# Patient Record
Sex: FEMALE | Race: OTHER | Employment: UNEMPLOYED | URBAN - METROPOLITAN AREA
[De-identification: names, ages, dates, MRNs, and addresses within clinical notes are randomized per-mention and may not be internally consistent; named-entity substitution may affect disease eponyms.]

---
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
## 2022-01-08 ENCOUNTER — NON-APPOINTMENT (OUTPATIENT)
Age: 22
End: 2022-01-08

## 2022-01-25 LAB
HBSAG, EXTERNAL: NEGATIVE
HIV, EXTERNAL: NEGATIVE
RUBELLA, EXTERNAL: NORMAL
T. PALLIDUM, EXTERNAL: NON REACTIVE
TYPE, ABO & RH, EXTERNAL: NORMAL

## 2022-02-01 ENCOUNTER — DOCUMENTATION ONLY (OUTPATIENT)
Dept: FAMILY MEDICINE CLINIC | Age: 22
End: 2022-02-01

## 2022-02-01 NOTE — PROGRESS NOTES
This patient is a new patient who missed an appt on 1/31/22 to establish care in our [facility] clinic. She was previously seen at [facility] for some of her prenatal care and I have scanned these documents into the chart.     Cuauhtemoc Rosas MD

## 2022-02-04 ENCOUNTER — HOSPITAL ENCOUNTER (OUTPATIENT)
Dept: PERINATAL CARE | Age: 22
Discharge: HOME | End: 2022-02-04
Attending: OBSTETRICS & GYNECOLOGY
Payer: MEDICAID

## 2022-02-04 PROCEDURE — 76805 OB US >/= 14 WKS SNGL FETUS: CPT | Performed by: OBSTETRICS & GYNECOLOGY

## 2022-02-23 ENCOUNTER — ROUTINE PRENATAL (OUTPATIENT)
Dept: FAMILY MEDICINE CLINIC | Age: 22
End: 2022-02-23
Payer: MEDICAID

## 2022-02-23 VITALS
WEIGHT: 175.8 LBS | DIASTOLIC BLOOD PRESSURE: 62 MMHG | BODY MASS INDEX: 33.19 KG/M2 | HEART RATE: 78 BPM | TEMPERATURE: 98 F | SYSTOLIC BLOOD PRESSURE: 98 MMHG | HEIGHT: 61 IN | OXYGEN SATURATION: 97 %

## 2022-02-23 DIAGNOSIS — O09.892 HIGH RISK TEEN PREGNANCY IN SECOND TRIMESTER: ICD-10-CM

## 2022-02-23 DIAGNOSIS — Z3A.25 25 WEEKS GESTATION OF PREGNANCY: ICD-10-CM

## 2022-02-23 DIAGNOSIS — O09.32 INITIAL OBSTETRIC VISIT IN SECOND TRIMESTER: ICD-10-CM

## 2022-02-23 DIAGNOSIS — Z86.79 HX OF AORTIC ANEURYSM: ICD-10-CM

## 2022-02-23 DIAGNOSIS — Z3A.25 25 WEEKS GESTATION OF PREGNANCY: Primary | ICD-10-CM

## 2022-02-23 DIAGNOSIS — Z98.890 H/O AORTIC ANEURYSM REPAIR: ICD-10-CM

## 2022-02-23 DIAGNOSIS — Z86.79 H/O AORTIC ANEURYSM REPAIR: ICD-10-CM

## 2022-02-23 LAB
BILIRUB UR QL STRIP: NEGATIVE
GLUCOSE UR-MCNC: NEGATIVE MG/DL
KETONES P FAST UR STRIP-MCNC: NEGATIVE MG/DL
PH UR STRIP: 8.5 [PH] (ref 4.6–8)
PROT UR QL STRIP: NEGATIVE
SP GR UR STRIP: 1.02 (ref 1–1.03)
UA UROBILINOGEN AMB POC: ABNORMAL (ref 0.2–1)
URINALYSIS CLARITY POC: ABNORMAL
URINALYSIS COLOR POC: YELLOW
URINE BLOOD POC: ABNORMAL
URINE LEUKOCYTES POC: ABNORMAL
URINE NITRITES POC: NEGATIVE

## 2022-02-23 PROCEDURE — 0502F SUBSEQUENT PRENATAL CARE: CPT | Performed by: STUDENT IN AN ORGANIZED HEALTH CARE EDUCATION/TRAINING PROGRAM

## 2022-02-23 PROCEDURE — 81003 URINALYSIS AUTO W/O SCOPE: CPT | Performed by: STUDENT IN AN ORGANIZED HEALTH CARE EDUCATION/TRAINING PROGRAM

## 2022-02-23 NOTE — PROGRESS NOTES
Initial OB Visit     [facility] interpretor used due to language barrier [id number]  Subjective:   Maryann Lynn is a 25 y.o. Lizeth Buffy  at 25w4d with BARB of 22 based on 2nd trimester US performed on 22 , who is being seen today for her first initial obstetrical visit. This is not a planned pregnancy. FOB is not involved. She is at 25w4d by 2nd trimester US. See flow sheet for OB history. History of Depression in pregnancy or post partum? No  History of GDM or DM? No  History of GHTN or HTN? No  History of pre-eclampsia? No  History of thyroid disorder? No  History of ? No  History of PTD? No  History of ? No  History of Genetic disorders? No  History of STD's? No  History of abnormal PAP? No  Taking prenatal vitamins? Yes    Pt has a hx of dilated aorta and states that she had a surgery at age 5 to correct the dilation in [facility]. Per pt this was inherited, her brother also required this procedure. Pt endorses going for annual follow up screening exams for her aorta but has not had any follow up in the last 2 years since she has been in the [address] [place] [address],[address]. Pt had been following at [facility] for her prenatal care however they referred her to this clinic as this is a high risk pregnancy. Allergies- reviewed: Allergies   Allergen Reactions    Clarithromycin Rash       Medications- reviewed:   Current Outpatient Medications   Medication Sig    PNV Comb #2-Iron-FA-Omega 3 29-1-400 mg cmpk Take  by mouth. No current facility-administered medications for this visit.        Past Medical History- reviewed:  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    Heart surgery age 5       Past Surgical History- reviewed:   Past Surgical History:   Procedure Laterality Date    HX OTHER SURGICAL  2006    Aorta dilation repair       Social History- reviewed:  Social History     Socioeconomic History    Marital status: SINGLE     Spouse name: Not on file    Number of children: Not on file    Years of education: Not on file    Highest education level: Not on file   Occupational History    Not on file   Tobacco Use    Smoking status: Current Every Day Smoker     Packs/day: 0.25     Types: Cigarettes    Smokeless tobacco: Never Used   Substance and Sexual Activity    Alcohol use: Yes     Comment: occa    Drug use: Never    Sexual activity: Not on file   Other Topics Concern    Not on file   Social History Narrative    Not on file     Social Determinants of Health     Financial Resource Strain:     Difficulty of Paying Living Expenses: Not on file   Food Insecurity:     Worried About Running Out of Food in the Last Year: Not on file    Kim of Food in the Last Year: Not on file   Transportation Needs:     Lack of Transportation (Medical): Not on file    Lack of Transportation (Non-Medical):  Not on file   Physical Activity:     Days of Exercise per Week: Not on file    Minutes of Exercise per Session: Not on file   Stress:     Feeling of Stress : Not on file   Social Connections:     Frequency of Communication with Friends and Family: Not on file    Frequency of Social Gatherings with Friends and Family: Not on file    Attends Orthodoxy Services: Not on file    Active Member of [address] [place] [address] [employer] or Organizations: Not on file    Attends Club or Organization Meetings: Not on file    Marital Status: Not on file   Intimate Partner Violence:     Fear of Current or Ex-Partner: Not on file    Emotionally Abused: Not on file    Physically Abused: Not on file    Sexually Abused: Not on file   Housing Stability:     Unable to Pay for Housing in the Last Year: Not on file    Number of [place] in the Last Year: Not on file    Unstable Housing in the Last Year: Not on file         Objective:     Visit Vitals  BP 98/62 (BP 1 Location: Left upper arm, BP Patient Position: Sitting)   Pulse 78   Temp 98 °F (36.7 °C) (Oral)   Ht 5' 1.22\" (1.555 m)   Wt 175 lb 12.8 oz (79.7 kg)   LMP 07/06/2021   SpO2 97%   BMI 32.98 kg/m²       See physical exam on flowsheet    Labs:    No results found for this or any previous visit (from the past 12 hour(s)). Assessment and Plan:         ICD-10-CM ICD-9-CM    1. 25 weeks gestation of pregnancy  Z3A.25 V22.2    2. Initial obstetric visit in second trimester  O09.32 V23.7        25 y.o. Marcia Aj at 25w4d here for initial OB visit     SIUP:   -PNL (per records from [facility]): O+, Ab neg, Hgb 12. Hep B/NG/CT/RPR negative. Rubella immune. Early 1hr GTT normal.   - Hgb fractionation, HIV, Hep C, varicella, urine culture, and 1hr GTT ordered to complete prenatal labs as listed above. Lab closed at time of encounter, pt to return for lab visit next week. -Genetic screening: Pt would like NIPT - will collect when pt comes for lab visit next week. -Ultrasound: Normal anatomy, female, posterior placenta. -Immunizations: Pt s/p flu vaccine 1/25 per [facility] notes. Needs Tdap at next appt. -UBB: Seen 2/23/22    Hx aortic aneurysm s/p repair: Surgical repair in [facility] at age 5. No follow up since moving to the [facility] (~2yrs). No known underlying etiology for aneurysm per pt (no connective tissue disease). Will order AAA screen and Echo and reach out to [facility] regarding additional management. Teen pregnancy: Will collect UDS. Seen by UBB 2/23. Orders Placed This Encounter    PNV Comb #2-Iron-FA-Omega 3 29-1-400 mg cmpk     Sig: Take  by mouth. I have discussed the diagnosis with the patient and the intended plan as seen in the above orders. The patient has received an after-visit summary and questions were answered concerning future plans. I have discussed medication side effects and warnings with the patient as well. Informed pt to return to the office or go to the ER if she experiences vaginal bleeding, vaginal discharge, leaking of fluid, pelvic cramping.       Pt seen and discussed with Dr. Alexia Sauer (attending physician)    Sagrario Lyons MD  Family Medicine Resident

## 2022-02-23 NOTE — PROGRESS NOTES
Identified Patient with two Patient identifiers (Name and ). Two Patient Identifiers confirmed. Reviewed record in preparation for visit and have obtained necessary documentation. Patient is 25w4d      LEAKAGE OF FLUID: No  BLEEDING: No  FETAL MOVEMENT: Yes  HEMA HOOKS CONTRACTIONS:No  PRENATAL VITAMINS: Yes  PAIN: No    Chief Complaint   Patient presents with    Routine Prenatal Visit            Visit Vitals  BP 98/62 (BP 1 Location: Left upper arm, BP Patient Position: Sitting)   Pulse 78   Temp 98 °F (36.7 °C) (Oral)   Ht 5' 1.22\" (1.555 m)   Wt 175 lb 12.8 oz (79.7 kg)   SpO2 97%   BMI 32.98 kg/m²       1. Have you been to the ER, urgent care clinic since your last visit? Hospitalized since your last visit? No    2. Have you seen or consulted any other health care providers outside of the [address] [place] [address] since your last visit? Include any pap smears or colon screening. No      *Called [facility] to fax over all entire record including ob labs that she got done and they will fax it over today. *

## 2022-02-23 NOTE — PROGRESS NOTES
I reviewed with the resident the medical history and the resident's findings on the physical examination. I discussed with the resident the patient's diagnosis and concur with the plan. 20yo G1 @ 25w4d  1. IUP: RH pos, needs Hep C, VZV. Offer NIPT. 2.  Teen pregnancy: check UDS  3. Transfer of care: from [facility] due to #4   4. Hx congenital heart disease: born with a aortic dilation and at age of 5 had a surgical repair in [place]. Was seen annually for US there. Has been here for 2 years and has not had any monitoring here. No known hx of connective tissues disease. Will get echo and abd US. ? Baby needs fetal echo - will run this history by MFM as it seems they were not aware when they saw pt   5.   Hx asthma: aortic dilation was seen on imaging that she had done for the asthma when she was young

## 2022-02-23 NOTE — PATIENT INSTRUCTIONS
Semanas 26 a 30 de antony embarazo: Instrucciones de cuidado  Weeks 26 to 30 of Your Pregnancy: Care Instructions  Instrucciones de cuidado    Ahora usted se encuentra en el último trimestre del Madisyn Beam. Antony bebé está creciendo rápidamente. Y es probable que sienta que antony bebé se mueve con más frecuencia. Es posible que antony médico le diga que cuente la cantidad de patadas que da antony bebé. La espalda puede dolerle mientras antony cuerpo se acostumbra al tamaño y a la longitud de antony bebé. Si todavía no le aguayo aplicado la vacuna Tdap (tétanos, difteria y tos [place]) prema selena Madisyn Beam, hable con antony médico acerca de aplicársela. Mac Petrin a proteger a antony recién nacido contra la infección por tos ferina. Prema selena tiempo, es importante que se cuide y preste atención a lo que antony cuerpo necesita. Si tiene [employer], busque formas de estar con antony elizabeth que satisfagan antony nivel de comodidad y deseo. Utilice las recomendaciones proporcionadas en esta hoja de cuidados para encontrar antony propia manera de vivir la sexualidad. La atención de seguimiento es janet parte clave de antony tratamiento y seguridad. Asegúrese de hacer y acudir a todas las citas, y llame a antony médico si está teniendo problemas. También es janet buena idea saber los resultados de justus exámenes y mantener janet lista de los medicamentos que shubham. ¿Cómo puede cuidarse en el hogar? [place] antony trabajo con calma  · Tómese descansos frecuentes. Si es posible, deje de trabajar cuando esté cansada y descanse prema la hora del almuerzo. · Vaya al baño cada 2 horas. · Cambie de posición con frecuencia. Si está sentada prema mucho tiempo, póngase de pie y camine. · Si está bui prema mucho tiempo, apoye un pie sobre un banco bajo, flexionando la rodilla. Después de estar bui prema mucho tiempo, siéntese con los pies elevados.   · Evite las [place], las sustancias químicas y el humo del tabaco.  Viva antony sexualidad a antony manera  · Apteegi 1 prema el [employer], a menos que antony médico le diga que no. · Podría tener un gran deseo sexual o estar completamente desinteresada. · El abdomen cada vez más shavon podría hacer difícil encontrar janet buena posición prema el coito. Experimente y explore. · Cuando antony elizabeth le toque los senos, podría tener contracciones en [place]. · Un masaje en la espalda podría aliviar el dolor de espalda o los cólicos que a veces se sienten después del Surveyor. Aprenda sobre el trabajo de parto prematuro  · Esté alerta a las señales del trabajo de parto prematuro. Es posible que esté comenzando el [place] de parto si:  ? Tiene cólicos similares a los del período menstrual, con o sin náuseas. ? Tiene aproximadamente 4 contracciones o más en 20 minutos, o alrededor de 8 o más en 1 hora, incluso después de lee tomado un vaso de agua y estar en reposo. ? Tiene un dolor sordo (leve jacinto continuo) en la shaw baja de la espalda que no desaparece cuando cambia de posición. ? Siente dolor o presión en la pelvis que aparece y desaparece con determinada regularidad. ? Tiene espasmos intestinales o síntomas similares a los de la gripe, con o sin diarrea. ? Nota un aumento o un cambio en el flujo vaginal. El flujo podría ser [place]on De[place], tener consistencia mucosa, ser David Section rastros de [place]. ? Se le rompe la binh. · Si ronn que ha comenzado un trabajo de parto prematuro:  ? Carmencita 2 o 3 vasos de agua o jugo. No beber suficientes líquidos puede provocar contracciones. ? Detenga lo que esté haciendo, y vacíe la vejiga. Luego, acuéstese sobre el lado lori prema al menos 1 hora. ? Mientras descansa de griselda, ubique antony esternón. Coloque los dedos en el punto blando keo debajo de él. Mueva los dedos hacia abajo en dirección al ombligo para encontrar la parte superior del [place]. Compruebe si está tenso. ? Las contracciones pueden ser débiles o intensas. Registre justus contracciones prema janet hora.  Cuente janet contracción desde el comienzo de janet hasta el comienzo de [place]. ? Marcia [place] contracciones tonia que no ocurren con la regularidad de un patrón reciben el nombre de contracciones de [place]. Estas son \"contracciones de práctica\", jacinto no indican el inicio del [place]. Por lo general, se detienen si cambia de [place]. ? Si tiene contracciones regulares, llame a antony médico.  ¿Dónde puede encontrar más información en inglés? Yisel Neli a [email or web address].gray.[email or web address]/  Ruddy M963 en la búsqueda para aprender más acerca de \"Semanas 26 a 30 de antony embarazo: Instrucciones de cuidado. \"  Revisado: 16 junio, 2021               Versión del contenido: 13.0  © 9236-1749 Healthwise, Incorporated. Las instrucciones de cuidado fueron adaptadas bajo licencia por Good [employer] Connections (which disclaims liability or warranty for this information). Si usted tiene [place] afección médica o sobre estas instrucciones, siempre pregunte a antony profesional de tyrone. Healthwise, Incorporated niega toda garantía o responsabilidad por antony uso de esta información. Amira Sales a antony médico prema el embarazo (después de 20 semanas)  Learning About When to Call Your Doctor During Pregnancy (After 20 Weeks)  Instrucciones de cuidado  Es normal que tenga inquietudes acerca de lo que podría ser un problema prema el [facility]. Aunque la mayoría de las mujeres embarazadas no tienen ningún problema grave, es importante saber cuándo llamar a antony médico si tiene determinados síntomas o señales de trabajo de [place]. Estas son algunas sugerencias generales. Antony médico puede darle más información sobre cuándo llamar. Cuándo llamar a antony médico (después de 20 semanas)  Llame al 911  en cualquier momento que considere que necesita atención de [place]. Por ejemplo, llame si:  · Tiene sangrado vaginal intenso. · Tiene dolor repentino e intenso en el abdomen.   · Se desmayó (perdió el conocimiento). · Tiene janet convulsión. · Ve o siente el cordón umbilical.  · Colette que está a punto de edwardo a gloria a antony bebé y no puede llegar en forma telles al hospital.  Danny Arnold a antony médico ahora mismo o busque atención médica inmediata si:  · Tiene sangrado vaginal.  · Tiene dolor en el abdomen. · Tiene fiebre. · Tiene síntomas de preeclampsia, corina:  ? Hinchazón repentina de la juan a, las lisbeth o los pies. ? Nuevos problemas de visión (corina oscurecimiento, brooks borroso o brooks puntos). ? Dolor de anne intenso. · Tiene janet pérdida repentina de líquido por la vagina. (Piensa que rompió la bnih). · Piensa que puede lee comenzado el [place] de [place]. Sarita significa que ha tenido al menos 6 contracciones en [employer]. · Nota que antony bebé ha dejado de moverse o lo hace mucho menos de lo habitual.  · Tiene síntomas de janet infección urinaria. Estos pueden incluir:  ? Dolor o ardor al orinar. ? Necesidad de orinar con frecuencia sin poder eliminar mucha orina. ? Dolor en el flanco, que se encuentra keo debajo de la caja torácica y [place] de la cintura en un lado de la espalda. ? [employer]. Preste especial atención a los cambios en antony tyrone y asegúrese de comunicarse con antony médico si:  · Tiene flujo vaginal con un olor desagradable. · Tiene cambios en la piel, tales corina:  ? Salpullido. ? Comezón. ? Color amarillento en la piel. · Tiene otras inquietudes acerca de antony embarazo. Si tiene signos de trabajo de parto al llegar a las [address] o más  Si tiene señales de [place] de parto a las 37 semanas o [place], es posible que antony médico le diga que llame cuando antony trabajo de parto se vuelva más [place]. Los síntomas del trabajo de parto activo incluyen:  · Contracciones que son regulares. · Contracciones a intervalos de menos de 5 minutos. · Contracciones prema las cuales es difícil hablar. La atención de seguimiento es janet parte clave de antony tratamiento y seguridad.  Asegúrese de hacer y acudir a todas las citas, y llame a antony médico si está teniendo problemas. También es janet buena idea saber los resultados de justus exámenes y mantener janet lista de los medicamentos que shubham. ¿Dónde puede encontrar más información en inglés? Vaya a [email or web address].acosta.[email or web address]/  Nely Madison N531 en la búsqueda para aprender más acerca de \"Aprenda cuándo llamar a antony médico prema el embarazo (después de 20 semanas). \"  Revisado: 16 junio, 2021               Versión del contenido: 13.0  © 9559-4897 Healthwise, Incorporated. Las instrucciones de cuidado fueron adaptadas bajo licencia por Good Help Connections (which disclaims liability or warranty for this information). Si usted tiene [place] afección médica o sobre estas instrucciones, siempre pregunte a antony profesional de tyrone. [employer], [employer] niega toda garantía o responsabilidad por antony uso de esta información.

## 2022-02-25 LAB
BACTERIA SPEC CULT: NORMAL
CC UR VC: NORMAL
SERVICE CMNT-IMP: NORMAL

## 2022-04-02 ENCOUNTER — HOSPITAL ENCOUNTER (EMERGENCY)
Age: 22
Discharge: HOME | End: 2022-04-02
Payer: MEDICAID

## 2022-04-02 VITALS
RESPIRATION RATE: 16 BRPM | SYSTOLIC BLOOD PRESSURE: 126 MMHG | DIASTOLIC BLOOD PRESSURE: 60 MMHG | HEART RATE: 76 BPM | TEMPERATURE: 97.9 F

## 2022-04-02 PROCEDURE — 99282 EMERGENCY DEPT VISIT SF MDM: CPT

## 2022-04-02 NOTE — PROGRESS NOTES
1756-  Pt arrived to [facility] 1 with c/o decreased fetal movement. Pt denies pain, vaginal bleeding, LOF.     1803-  E. ADIS Mcnair notified of pt's arrival and c/o.    [phone]- KAMRYN. ADIS Mcnair at bedside. POC reviewed with pt.    1827- Pt given ice water and apple juice. 1854-  Pt reports feeling fetal movement. Movement audible on EFM and palpated by this RN. 1904-  Strip reviewed with LUDIVINAM. Discharge order received. 7682- Discharge instructions reviewed with pt, all questions answered. D/c to home.

## 2022-04-03 NOTE — ED PROVIDER NOTES
G1 @ 31+1 arrives to [facility] with c/o decreased fetal movement. Has not felt any movements since waking up. Tried soda and ice cream with no response. Pt given juice and ice water, began feeling kicks and heard them via the EFM. Pt of Dr Adriana Fraser MD for initial [facility] and then had one visit last week with Dr Yuly Rivera. 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 Otherwise wnl PNC course. 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    Heart surgery age 5       Past Surgical History:   Procedure Laterality Date    HX OTHER SURGICAL  04/06/2006    Aorta dilation repair         Family History:   Problem Relation Age of Onset    Congenital heart defect Brother         dilation of aorta, surgical repair       Social History     Socioeconomic History    Marital status: SINGLE     Spouse name: Not on file    Number of children: Not on file    Years of education: Not on file    Highest education level: Not on file   Occupational History    Not on file   Tobacco Use    Smoking status: Current Every Day Smoker     Packs/day: 0.25     Types: Cigarettes    Smokeless tobacco: Never Used   Substance and Sexual Activity    Alcohol use: Yes     Comment: occa    Drug use: Never    Sexual activity: Not on file   Other Topics Concern    Not on file   Social History Narrative    Not on file     Social Determinants of Health     Financial Resource Strain:     Difficulty of Paying Living Expenses: Not on file   Food Insecurity:     Worried About Running Out of Food in the Last Year: Not on file    Kim of Food in the Last Year: Not on file   Transportation Needs:     Lack of Transportation (Medical): Not on file    Lack of Transportation (Non-Medical):  Not on file   Physical Activity:     Days of Exercise per Week: Not on file    Minutes of Exercise per Session: Not on file   Stress:     Feeling of Stress : Not on file   Social Connections:     Frequency of Communication with Friends and Family: Not on file    Frequency of Social Gatherings with Friends and Family: Not on file    Attends Mosque Services: Not on file    Active Member of Clubs or Organizations: Not on file    Attends Club or Organization Meetings: Not on file    Marital Status: Not on file   Intimate Partner Violence:     Fear of Current or Ex-Partner: Not on file    Emotionally Abused: Not on file    Physically Abused: Not on file    Sexually Abused: Not on file   Housing Stability:     Unable to Pay for Housing in the Last Year: Not on file    Number of [place] in the Last Year: Not on file    Unstable Housing in the Last Year: Not on file         ALLERGIES: Clarithromycin    Review of Systems   Constitutional: Negative. Negative for chills and fever. HENT: Negative. Eyes: Negative. Respiratory: Negative. Cardiovascular: Negative. Gastrointestinal: Negative. Negative for constipation, diarrhea, nausea and vomiting. Endocrine: Negative. Genitourinary: Negative. Negative for dysuria, hematuria and vaginal bleeding. Musculoskeletal: Negative. Allergic/Immunologic: Negative. Neurological: Negative. Hematological: Negative. Psychiatric/Behavioral: Negative. Vitals:    04/02/22 1801   BP: 126/60   Pulse: 76   Resp: 16   Temp: 97.9 °F (36.6 °C)            Physical Exam  Constitutional:       Appearance: Normal appearance. HENT:      Head: Normocephalic. Cardiovascular:      Rate and Rhythm: Normal rate. Pulmonary:      Effort: Pulmonary effort is normal.   Abdominal:      Palpations: Abdomen is soft. Tenderness: There is no abdominal tenderness. Musculoskeletal:         General: Normal range of motion. Cervical back: Normal range of motion. Skin:     General: Skin is warm and dry. Neurological:      General: No focal deficit present.       Mental Status: She is alert and oriented to person, place, and time. Psychiatric:         Mood and Affect: Mood normal.         Behavior: Behavior normal.      EFM: 140, moderate variability, accels present, no decels    MDM  Number of Diagnoses or Management Options  Risk of Complications, Morbidity, and/or Mortality  Presenting problems: moderate  Diagnostic procedures: moderate  Management options: moderate    Critical Care  Total time providing critical care:  minutes    Patient Progress  Patient progress: stable     G1 @ 31w with DFM, now feeling baby move  1. RNST  2. Discharge home  3. Rvw'd warning signs and when to return for concerns  4.  Keep LEAH, and encouraged to discuss fetal echo with Dr Nitin Velazquez    Procedures

## 2022-05-11 LAB — GRBS, EXTERNAL: NEGATIVE

## 2022-06-01 ENCOUNTER — HOSPITAL ENCOUNTER (EMERGENCY)
Age: 22
Discharge: HOME | DRG: 560 | End: 2022-06-01
Attending: OBSTETRICS & GYNECOLOGY | Admitting: OBSTETRICS & GYNECOLOGY
Payer: MEDICAID

## 2022-06-01 VITALS
TEMPERATURE: 98.4 F | WEIGHT: 185 LBS | DIASTOLIC BLOOD PRESSURE: 55 MMHG | HEIGHT: 61 IN | BODY MASS INDEX: 34.93 KG/M2 | HEART RATE: 69 BPM | RESPIRATION RATE: 16 BRPM | SYSTOLIC BLOOD PRESSURE: 122 MMHG

## 2022-06-01 LAB
ALBUMIN SERPL-MCNC: 2.8 G/DL (ref 3.5–5)
ALBUMIN/GLOB SERPL: 0.8 {RATIO} (ref 1.1–2.2)
ALP SERPL-CCNC: 275 U/L (ref 45–117)
ALT SERPL-CCNC: 13 U/L (ref 12–78)
ANION GAP SERPL CALC-SCNC: 9 MMOL/L (ref 5–15)
AST SERPL-CCNC: 16 U/L (ref 15–37)
BASOPHILS # BLD: 0 K/UL (ref 0–0.1)
BASOPHILS NFR BLD: 0 % (ref 0–1)
BILIRUB SERPL-MCNC: 0.2 MG/DL (ref 0.2–1)
BUN SERPL-MCNC: 9 MG/DL (ref 6–20)
BUN/CREAT SERPL: 14 (ref 12–20)
CALCIUM SERPL-MCNC: 9.2 MG/DL (ref 8.5–10.1)
CHLORIDE SERPL-SCNC: 108 MMOL/L (ref 97–108)
CO2 SERPL-SCNC: 20 MMOL/L (ref 21–32)
CREAT SERPL-MCNC: 0.66 MG/DL (ref 0.55–1.02)
CREAT UR-MCNC: 134 MG/DL
DIFFERENTIAL METHOD BLD: ABNORMAL
EOSINOPHIL # BLD: 0.1 K/UL (ref 0–0.4)
EOSINOPHIL NFR BLD: 2 % (ref 0–7)
ERYTHROCYTE [DISTWIDTH] IN BLOOD BY AUTOMATED COUNT: 13.2 % (ref 11.5–14.5)
GLOBULIN SER CALC-MCNC: 3.5 G/DL (ref 2–4)
GLUCOSE SERPL-MCNC: 90 MG/DL (ref 65–100)
HCT VFR BLD AUTO: 35.6 % (ref 35–47)
HGB BLD-MCNC: 12.3 G/DL (ref 11.5–16)
IMM GRANULOCYTES # BLD AUTO: 0 K/UL (ref 0–0.04)
IMM GRANULOCYTES NFR BLD AUTO: 1 % (ref 0–0.5)
LDH SERPL L TO P-CCNC: 175 U/L (ref 81–246)
LYMPHOCYTES # BLD: 1.3 K/UL (ref 0.8–3.5)
LYMPHOCYTES NFR BLD: 19 % (ref 12–49)
MCH RBC QN AUTO: 31.1 PG (ref 26–34)
MCHC RBC AUTO-ENTMCNC: 34.6 G/DL (ref 30–36.5)
MCV RBC AUTO: 90.1 FL (ref 80–99)
MONOCYTES # BLD: 0.5 K/UL (ref 0–1)
MONOCYTES NFR BLD: 8 % (ref 5–13)
NEUTS SEG # BLD: 4.9 K/UL (ref 1.8–8)
NEUTS SEG NFR BLD: 70 % (ref 32–75)
NRBC # BLD: 0 K/UL (ref 0–0.01)
NRBC BLD-RTO: 0 PER 100 WBC
PLATELET # BLD AUTO: 239 K/UL (ref 150–400)
PMV BLD AUTO: 9.5 FL (ref 8.9–12.9)
POTASSIUM SERPL-SCNC: 4.1 MMOL/L (ref 3.5–5.1)
PROT SERPL-MCNC: 6.3 G/DL (ref 6.4–8.2)
PROT UR-MCNC: 21 MG/DL (ref 0–11.9)
PROT/CREAT UR-RTO: 0.2
RBC # BLD AUTO: 3.95 M/UL (ref 3.8–5.2)
SODIUM SERPL-SCNC: 137 MMOL/L (ref 136–145)
URATE SERPL-MCNC: 5.9 MG/DL (ref 2.6–6)
WBC # BLD AUTO: 6.9 K/UL (ref 3.6–11)

## 2022-06-01 PROCEDURE — 36415 COLL VENOUS BLD VENIPUNCTURE: CPT

## 2022-06-01 PROCEDURE — 83615 LACTATE (LD) (LDH) ENZYME: CPT

## 2022-06-01 PROCEDURE — 85025 COMPLETE CBC W/AUTO DIFF WBC: CPT

## 2022-06-01 PROCEDURE — 84156 ASSAY OF PROTEIN URINE: CPT

## 2022-06-01 PROCEDURE — 80053 COMPREHEN METABOLIC PANEL: CPT

## 2022-06-01 PROCEDURE — 84550 ASSAY OF BLOOD/URIC ACID: CPT

## 2022-06-01 NOTE — PROGRESS NOTES
1606. Pt arrived from office with elevated pressures. Pt with father and . Pt denies headache, blurry vision and RUQ pain. 1835. Dr. Tevin Trejo at bedside to discuss pt labs, BP, and plan of care. Pt to be discharged home and follow up with dr. Ferny Lmoeli as needed. Pt instructed on when to call office. Pt has scheduled induction for 6/10/22 unless otherwise indicated. Pt given discharge instructions in Azeri and all questions answered. 1840. Pt will call dr. Ferny Lomeli office in morning to update pt status. Pt discharged home with her father.

## 2022-06-01 NOTE — DISCHARGE INSTRUCTIONS
Patient Education   Patient Education        Semana 44 de antony embarazo: Instrucciones de cuidado  Week 44 of Your Pregnancy: Care Instructions  Instrucciones de cuidado     [place] estas últimas semanas, podría sentirse ansiosa por brooks a antony nuevo bebé. Los bebés recién nacidos suelen tener un aspecto distinto al que ve en fotografías o películas. Inmediatamente después del nacimiento, es posible que la anne tenga janet forma extraña. Los ojos podrían estar inflamados. Y los genitales vance vez estén hinchados. Además, podrían tener la piel muy seca o marcas rojizas en los párpados, la nariz o el julián. De todos modos, la mayoría de los padres creen que justus bebés son hermosos. La atención de seguimiento es janet parte clave de antony tratamiento y seguridad. Asegúrese de hacer y acudir a todas las citas, y llame a antony médico si está teniendo problemas. También es janet buena idea saber los resultados de justus exámenes y mantener janet lista de los medicamentos que shubham. ¿Cómo puede cuidarse en el hogar? Prepárese para amamantar  · Si amamanta, siga comiendo alimentos saludables. · Si antony proveedor de Lucita se lo recomienda, siga tomando justus vitaminas prenatales. · Hable con antony médico antes de sara cualquier medicamento o suplemento. Eso es porque algunos medicamentos pueden afectar la [place]. · Puede ayudar a evitar dolor en los pezones alimentando a antony bebé en la posición correcta. Las [employer] ayudarán a aprender [employer]. Elija el método anticonceptivo correcto después de que nazca el bebé  · Las mujeres que están amamantando aún pueden Levi Dykes. Use un método anticonceptivo si no quiere quedar embarazada. · Los dispositivos intrauterinos (DIU) son muy eficaces para prevenir el embarazo y pueden proporcionar protección contra el embarazo por entre 3 y 11 años, según el tipo.  Si usted habla con antony médico antes de tener a antony bebé, pueden colocarle el DIU inmediatamente después de edwardo a gloria. Si usted decide que desea quedar embarazada más adelante, pueden extraérselo. Son seguros de usar Wendelin Real. · Un implante hormonal también es muy eficaz para prevenir el embarazo. Se coloca debajo de la piel del brazo. [place] puede hacerse inmediatamente después de edwardo a gloria. Libera la hormona progestina y previene el embarazo por alrededor de 3 años. También puede extraerlo un médico en cualquier momento. Es seguro de usar mientras está amamantando. · Se puede usar Depo-Provera mientras está amamantando. Es janet inyección que se aplica cada 3 meses. · Las pastillas anticonceptivas funcionan romi. Ariel usted necesita janet clase diferente de pastilla las primeras semanas después de edwardo a gloria. Y cuando comience a sara estas pastillas, tendrá que asegurarse de usar otro tipo de anticonceptivo por 7 días después de comenzar el paquete. · Los diafragmas, los capuchones cervicales y los condones con espermicidas son menos eficaces después de que da a gloria. Si usted tiene un diafragma o un capuchón cervical, hable con antony médico para brooks si necesita un tamaño diferente. · Tanto la ligadura de trompas (atadura de trompas) corina la vasectomía son permanentes. Estas son Katelyn Ebbing opciones si está telles de que ya no va a querer [facility]. ¿Dónde puede encontrar más información en inglés? Vaya a [email or web address].[email or web address]/  Ruddy [id number] en la búsqueda para aprender más acerca de \"Semana 44 de antony embarazo: Instrucciones de cuidado. \"  Revisado: 16 junio, 2021               Versión del contenido: 13.2  © 8620-9363 Healthwise, The 517 travel. Las instrucciones de cuidado fueron adaptadas bajo licencia por Good Help Connections (which disclaims liability or warranty for this information). Si usted tiene [place] afección médica o sobre estas instrucciones, siempre pregunte a antony profesional de tyrone.  [employer] 517 travel niega toda garantía o responsabilidad por antony uso de esta información. Hipertensión arterial aguda: Instrucciones de cuidado  Acute High Blood Pressure: Care Instructions  Instrucciones de cuidado     La hipertensión arterial aguda es presión arterial muy fabrice. Es un problema mara. La presión arterial muy fabrice puede dañar el cerebro, el corazón, los ojos y los riñones. Es posible que le hayan dado medicamentos para disminuirle la presión arterial. Quizás se los dieron en forma de píldora o a través de janet aguja en janet de justus venas. A esto se le llama intravenosamente, o por IV. Y puede que [employer] hayan dado otros medicamentos. Estos pueden ser necesarios si la presión arterial le causa otros problemas. Para mantener antony presión arterial a un [address] MAGY joshi, es posible que deba hacer cambios saludables en antony estilo de bing. Y probablemente será necesario que tome medicamentos. Asegúrese de hacer un seguimiento con antony médico sobre antony presión arterial y lo que puede hacer al [address]. La atención de seguimiento es janet parte clave de antony tratamiento y seguridad. Asegúrese de hacer y acudir a todas las citas, y llame a antony médico si está teniendo problemas. También es janet buena idea saber los resultados de justus exámenes y mantener janet lista de los medicamentos que shubham. ¿Cómo puede cuidarse en el hogar? · Gabrielle a antony médico tanto corina selena se lo recomiende. [place] es para asegurarse de que antony presión arterial esté bajo control. · [employer] medicamentos para la presión arterial exactamente corina le fueron recetados. Puede que tome un tipo o más de [place]. Estos incluyen diuréticos, betabloqueantes, inhibidores de la ECA, antagonistas del calcio y Christiano de los receptores de la angiotensina II. Llame a antony médico si piensa que está teniendo un problema con antony medicamento. · Si shubham medicamentos para la presión arterial, hable con antony médico antes de sara descongestionantes o antiinflamatorios, corina ibuprofeno.  Estos pueden aumentar la presión arterial.  · Jody Sheerer a revisarse la presión arterial en casa. Revísela con frecuencia. · Pregúntele a antony médico si puede sara alcohol. · Hable con antony médico sobre los cambios de estilo de bing que pueden ayudar con la presión arterial. Estos incluyen hacer actividad y controlar el peso. · No fume. Fumar aumenta antony riesgo de ataque cardíaco y cerebral.  ¿Cuándo debe pedir ayuda? Llame al 911  en cualquier momento que considere que necesita atención de [place]. [place] puede significar que tiene síntomas que sugieren que antony presión arterial le está causando un problema cardíaco o vascular grave. Antony presión arterial podría ser superior a 180/120. Por ejemplo, llame al 911 si:    · Tiene síntomas de un ataque al corazón. Estos pueden incluir:  ? Frutoso [place] en el pecho, o janet sensación extraña en el pecho.  ? Sudoración. ? Falta de aire. ? Náuseas o vómito. ? Dolor, presión o janet sensación extraña en la espalda, el julián, la mandíbula, la parte superior del abdomen o en lety o ambos hombros o brazos. ? Aturdimiento o debilidad repentina. ? Latidos del corazón rápidos o irregulares.     · Tiene síntomas de un ataque cerebral. Estos pueden incluir:  ? Entumecimiento, hormigueo, debilidad o parálisis repentinos en la juan a, el brazo o la pierna, sobre todo en un solo lado del cuerpo. ? Cambios repentinos en la visión. ? Dificultades repentinas para hablar. ? Confusión repentina o dificultad súbita para comprender frases sencillas. ? Problemas repentinos para caminar o mantener el equilibrio. ? Dolor de [place] intenso y repentino, distinto de los jonathan de anne anteriores.     · Tiene un dolor intenso en la espalda o el abdomen. No espere a que la presión arterial baje por sí dakota. Obtenga ayuda de inmediato.   Llame a antony médico ahora mismo o busque atención de inmediato si:    · Tiene la presión arterial mucho más fabrice de lo normal (corina 180/120 o más fabrice), jacinto no tiene síntomas.     · Colette que la presión arterial fabrice le está causando síntomas, tales corina:  ? Dolor de anne intenso. ? Alexis Grounds. Preste especial atención a los cambios en antony tyrone y asegúrese de comunicarse con antony médico si:    · Antony presión arterial está más fabrice de lo que antony médico recomienda al [employer] en al menos 2 ocasiones. Moca significa que el número de [place] es más alto o [address] de abajo es más alto, o ambas cosas.     · Colette que podría estar teniendo efectos secundarios de los medicamentos para la presión arterial.   ¿Dónde puede encontrar más información en inglés? Margarito Rack a [email or web address]  Sahara Ax U237 en la búsqueda para aprender más acerca de \"Hipertensión arterial aguda: Instrucciones de cuidado. \"  Revisado: 10 enero, 2022               Versión del contenido: 13.2  © 0837-0292 Healthwise, Incorporated. Las instrucciones de cuidado fueron adaptadas bajo licencia por Good [employer] Connections (which disclaims liability or warranty for this information). Si usted tiene [place] afección médica o sobre estas instrucciones, siempre pregunte a antony profesional de tyrone. Healthwise, Incorporated niega toda garantía o responsabilidad por antony uso de esta información.

## 2022-06-01 NOTE — H&P
Labor and Delivery Admission Note  6/1/2022    23 y.o., , female, G1 P 0 Estimated Date of Delivery: 6/4/22 by dates and US presents to L &D after having elevated BPs at the [place] office. No HA, no visual changes, no epigastric pain, no contractions, no LOF, no vaginal bleeding  AFM    PNC with Dr. Lex Kyle    [facility]: Blood type:             RH: pos            Rubella:             SVII serology: neg            GBS status:   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    Heart surgery age 5     Past Surgical History:   Procedure Laterality Date    HX OTHER SURGICAL  04/06/2006    Aorta dilation repair     OB/GYN: Dr. Chetan Cleveland:   No current facility-administered medications for this encounter. Allergies: Allergies   Allergen Reactions    Clarithromycin Rash     Pertinent ROS: per HPI   Family History   Problem Relation Age of Onset    Congenital heart defect Brother         dilation of aorta, surgical repair     Social History     Socioeconomic History    Marital status: SINGLE     Spouse name: Not on file    Number of children: Not on file    Years of education: Not on file    Highest education level: Not on file   Occupational History    Not on file   Tobacco Use    Smoking status: Current Every Day Smoker     Packs/day: 0.25     Types: Cigarettes    Smokeless tobacco: Never Used   Substance and Sexual Activity    Alcohol use: Yes     Comment: occa    Drug use: Never    Sexual activity: Not on file   Other Topics Concern    Not on file   Social History Narrative    Not on file     Social Determinants of Health     Financial Resource Strain:     Difficulty of Paying Living Expenses: Not on file   Food Insecurity:     Worried About Running Out of Food in the Last Year: Not on file    Kim of Food in the Last Year: Not on file   Transportation Needs:     Lack of Transportation (Medical): Not on file    Lack of Transportation (Non-Medical):  Not on file Physical Activity:     Days of Exercise per Week: Not on file    Minutes of Exercise per Session: Not on file   Stress:     Feeling of Stress : Not on file   Social Connections:     Frequency of Communication with Friends and Family: Not on file    Frequency of Social Gatherings with Friends and Family: Not on file    Attends Jain Services: Not on file    Active Member of [address] [place] [address] [address] or Organizations: Not on file    Attends Club or Organization Meetings: Not on file    Marital Status: Not on file   Intimate Partner Violence:     Fear of Current or Ex-Partner: Not on file    Emotionally Abused: Not on file    Physically Abused: Not on file    Sexually Abused: Not on file   Housing Stability:     Unable to Pay for Housing in the Last Year: Not on file    Number of [place] in the Last Year: Not on file    Unstable Housing in the Last Year: Not on file       OBJECTIVE:  Gravid , female NAD  Temp (24hrs), Av.4 °F (36.9 °C), Min:98.4 °F (36.9 °C), Max:98.4 °F (36.9 °C)    Visit Vitals  BP (!) 122/55   Pulse 69   Temp 98.4 °F (36.9 °C)   Resp 16   Ht 5' 1\" (1.549 m)   Wt 83.9 kg (185 lb)   LMP 2021   BMI 34.96 kg/m²       Labs:    Lab Results   Component Value Date/Time    WBC 6.9 2022 04:24 PM       Exam:  Alert and oriented  HEENT:  normal   Lungs:  clear  Cor:  RRR  Abdomen:  Fundal height 39 cm                    Gravid, relaxed  Fetal heart rate tracin, +accels, moderate variability, no decels  Contraction pattern: none  Cervix: Deferred  Ext B/L No edema, no calf tenderness    Impression:  IUP at 39 weeks 4 days with elevated BPs in office  BPs here 136/85, 123/64, 124/65, 117/62, 122/55  PIH labs WNL: Pr/Cr rato 0.2  PIH precautions given  Advised to keep scheduled appointment with Dr. Jaswinder Gunderson MD

## 2022-06-03 ENCOUNTER — HOSPITAL ENCOUNTER (INPATIENT)
Age: 22
LOS: 3 days | Discharge: HOME | DRG: 560 | End: 2022-06-06
Attending: OBSTETRICS & GYNECOLOGY | Admitting: OBSTETRICS & GYNECOLOGY
Payer: MEDICAID

## 2022-06-03 PROCEDURE — 75410000002 HC LABOR FEE PER 1 HR

## 2022-06-03 PROCEDURE — 75810000275 HC EMERGENCY DEPT VISIT NO LEVEL OF CARE

## 2022-06-03 PROCEDURE — 65270000029 HC RM PRIVATE

## 2022-06-03 RX ORDER — OXYTOCIN/RINGER'S LACTATE 30/500 ML
10 PLASTIC BAG, INJECTION (ML) INTRAVENOUS AS NEEDED
Status: COMPLETED | OUTPATIENT
Start: 2022-06-03 | End: 2022-06-04

## 2022-06-03 RX ORDER — TERBUTALINE SULFATE 1 MG/ML
0.25 INJECTION SUBCUTANEOUS AS NEEDED
Status: DISCONTINUED | OUTPATIENT
Start: 2022-06-03 | End: 2022-06-04 | Stop reason: HOSPADM

## 2022-06-03 RX ORDER — ONDANSETRON 2 MG/ML
4 INJECTION INTRAMUSCULAR; INTRAVENOUS
Status: DISCONTINUED | OUTPATIENT
Start: 2022-06-03 | End: 2022-06-04 | Stop reason: HOSPADM

## 2022-06-03 RX ORDER — OXYTOCIN/RINGER'S LACTATE 30/500 ML
87.3 PLASTIC BAG, INJECTION (ML) INTRAVENOUS AS NEEDED
Status: COMPLETED | OUTPATIENT
Start: 2022-06-03 | End: 2022-06-04

## 2022-06-04 ENCOUNTER — ANESTHESIA EVENT (OUTPATIENT)
Dept: LABOR AND DELIVERY | Age: 22
DRG: 560 | End: 2022-06-04
Payer: MEDICAID

## 2022-06-04 ENCOUNTER — ANESTHESIA (OUTPATIENT)
Dept: LABOR AND DELIVERY | Age: 22
DRG: 560 | End: 2022-06-04
Payer: MEDICAID

## 2022-06-04 LAB
ALBUMIN SERPL-MCNC: 2.7 G/DL (ref 3.5–5)
ALBUMIN/GLOB SERPL: 0.8 {RATIO} (ref 1.1–2.2)
ALP SERPL-CCNC: 291 U/L (ref 45–117)
ALT SERPL-CCNC: 11 U/L (ref 12–78)
ANION GAP SERPL CALC-SCNC: 11 MMOL/L (ref 5–15)
AST SERPL-CCNC: 16 U/L (ref 15–37)
BILIRUB SERPL-MCNC: 0.3 MG/DL (ref 0.2–1)
BUN SERPL-MCNC: 10 MG/DL (ref 6–20)
BUN/CREAT SERPL: 13 (ref 12–20)
CALCIUM SERPL-MCNC: 8.7 MG/DL (ref 8.5–10.1)
CHLORIDE SERPL-SCNC: 107 MMOL/L (ref 97–108)
CO2 SERPL-SCNC: 20 MMOL/L (ref 21–32)
CREAT SERPL-MCNC: 0.76 MG/DL (ref 0.55–1.02)
CREAT UR-MCNC: 89.2 MG/DL
ERYTHROCYTE [DISTWIDTH] IN BLOOD BY AUTOMATED COUNT: 13.6 % (ref 11.5–14.5)
GLOBULIN SER CALC-MCNC: 3.6 G/DL (ref 2–4)
GLUCOSE SERPL-MCNC: 94 MG/DL (ref 65–100)
HCT VFR BLD AUTO: 36.1 % (ref 35–47)
HGB BLD-MCNC: 12.7 G/DL (ref 11.5–16)
LDH SERPL L TO P-CCNC: 188 U/L (ref 81–246)
MCH RBC QN AUTO: 31.3 PG (ref 26–34)
MCHC RBC AUTO-ENTMCNC: 35.2 G/DL (ref 30–36.5)
MCV RBC AUTO: 88.9 FL (ref 80–99)
NRBC # BLD: 0 K/UL (ref 0–0.01)
NRBC BLD-RTO: 0 PER 100 WBC
PLATELET # BLD AUTO: 226 K/UL (ref 150–400)
PMV BLD AUTO: 9.3 FL (ref 8.9–12.9)
POTASSIUM SERPL-SCNC: 4 MMOL/L (ref 3.5–5.1)
PROT SERPL-MCNC: 6.3 G/DL (ref 6.4–8.2)
PROT UR-MCNC: 19 MG/DL (ref 0–11.9)
PROT/CREAT UR-RTO: 0.2
RBC # BLD AUTO: 4.06 M/UL (ref 3.8–5.2)
SODIUM SERPL-SCNC: 138 MMOL/L (ref 136–145)
URATE SERPL-MCNC: 6.2 MG/DL (ref 2.6–6)
WBC # BLD AUTO: 9.6 K/UL (ref 3.6–11)

## 2022-06-04 PROCEDURE — 85027 COMPLETE CBC AUTOMATED: CPT

## 2022-06-04 PROCEDURE — A4300 CATH IMPL VASC ACCESS PORTAL: HCPCS

## 2022-06-04 PROCEDURE — 74011000250 HC RX REV CODE- 250: Performed by: ANESTHESIOLOGY

## 2022-06-04 PROCEDURE — 80053 COMPREHEN METABOLIC PANEL: CPT

## 2022-06-04 PROCEDURE — 74011250636 HC RX REV CODE- 250/636: Performed by: ANESTHESIOLOGY

## 2022-06-04 PROCEDURE — 84550 ASSAY OF BLOOD/URIC ACID: CPT

## 2022-06-04 PROCEDURE — 4A1HXCZ MONITORING OF PRODUCTS OF CONCEPTION, CARDIAC RATE, EXTERNAL APPROACH: ICD-10-PCS | Performed by: OBSTETRICS & GYNECOLOGY

## 2022-06-04 PROCEDURE — 84156 ASSAY OF PROTEIN URINE: CPT

## 2022-06-04 PROCEDURE — 65410000002 HC RM PRIVATE OB

## 2022-06-04 PROCEDURE — 75410000003 HC RECOV DEL/VAG/CSECN EA 0.5 HR

## 2022-06-04 PROCEDURE — 99283 EMERGENCY DEPT VISIT LOW MDM: CPT

## 2022-06-04 PROCEDURE — 75410000000 HC DELIVERY VAGINAL/SINGLE

## 2022-06-04 PROCEDURE — 36415 COLL VENOUS BLD VENIPUNCTURE: CPT

## 2022-06-04 PROCEDURE — 74011250637 HC RX REV CODE- 250/637: Performed by: ADVANCED PRACTICE MIDWIFE

## 2022-06-04 PROCEDURE — 0KQM0ZZ REPAIR PERINEUM MUSCLE, OPEN APPROACH: ICD-10-PCS | Performed by: OBSTETRICS & GYNECOLOGY

## 2022-06-04 PROCEDURE — 83615 LACTATE (LD) (LDH) ENZYME: CPT

## 2022-06-04 PROCEDURE — 75410000002 HC LABOR FEE PER 1 HR

## 2022-06-04 PROCEDURE — 76060000078 HC EPIDURAL ANESTHESIA

## 2022-06-04 PROCEDURE — 74011250636 HC RX REV CODE- 250/636: Performed by: MIDWIFE

## 2022-06-04 RX ORDER — OXYTOCIN/RINGER'S LACTATE 30/500 ML
0-20 PLASTIC BAG, INJECTION (ML) INTRAVENOUS
Status: DISCONTINUED | OUTPATIENT
Start: 2022-06-04 | End: 2022-06-06 | Stop reason: HOSPADM

## 2022-06-04 RX ORDER — FOLIC ACID/MULTIVIT,IRON,MINER 0.4MG-18MG
1 TABLET ORAL DAILY
Status: DISCONTINUED | OUTPATIENT
Start: 2022-06-05 | End: 2022-06-06 | Stop reason: HOSPADM

## 2022-06-04 RX ORDER — FENTANYL CITRATE 50 UG/ML
INJECTION, SOLUTION INTRAMUSCULAR; INTRAVENOUS
Status: COMPLETED
Start: 2022-06-04 | End: 2022-06-04

## 2022-06-04 RX ORDER — NORETHINDRONE AND ETHINYL ESTRADIOL 0.5-0.035
10 KIT ORAL ONCE
Status: ACTIVE | OUTPATIENT
Start: 2022-06-04 | End: 2022-06-04

## 2022-06-04 RX ORDER — BUPIVACAINE HYDROCHLORIDE 2.5 MG/ML
INJECTION, SOLUTION EPIDURAL; INFILTRATION; INTRACAUDAL
Status: COMPLETED
Start: 2022-06-04 | End: 2022-06-04

## 2022-06-04 RX ORDER — NORETHINDRONE AND ETHINYL ESTRADIOL 0.5-0.035
KIT ORAL
Status: DISCONTINUED
Start: 2022-06-04 | End: 2022-06-04 | Stop reason: WASHOUT

## 2022-06-04 RX ORDER — FENTANYL CITRATE 50 UG/ML
100 INJECTION, SOLUTION INTRAMUSCULAR; INTRAVENOUS ONCE
Status: COMPLETED | OUTPATIENT
Start: 2022-06-04 | End: 2022-06-04

## 2022-06-04 RX ORDER — FENTANYL/BUPIVACAINE/NS/PF 2-1250MCG
1-16 PREFILLED PUMP RESERVOIR EPIDURAL CONTINUOUS
Status: DISCONTINUED | OUTPATIENT
Start: 2022-06-04 | End: 2022-06-04 | Stop reason: HOSPADM

## 2022-06-04 RX ORDER — LIDOCAINE HYDROCHLORIDE AND EPINEPHRINE 15; 5 MG/ML; UG/ML
4.5 INJECTION, SOLUTION EPIDURAL AS NEEDED
Status: DISCONTINUED | OUTPATIENT
Start: 2022-06-04 | End: 2022-06-04 | Stop reason: HOSPADM

## 2022-06-04 RX ORDER — IBUPROFEN 400 MG/1
800 TABLET ORAL EVERY 8 HOURS
Status: DISCONTINUED | OUTPATIENT
Start: 2022-06-04 | End: 2022-06-06 | Stop reason: HOSPADM

## 2022-06-04 RX ORDER — BUPIVACAINE HYDROCHLORIDE 5 MG/ML
30 INJECTION, SOLUTION EPIDURAL; INTRACAUDAL AS NEEDED
Status: DISCONTINUED | OUTPATIENT
Start: 2022-06-04 | End: 2022-06-04 | Stop reason: HOSPADM

## 2022-06-04 RX ORDER — OXYTOCIN/RINGER'S LACTATE 30/500 ML
10 PLASTIC BAG, INJECTION (ML) INTRAVENOUS AS NEEDED
Status: DISCONTINUED | OUTPATIENT
Start: 2022-06-04 | End: 2022-06-06 | Stop reason: HOSPADM

## 2022-06-04 RX ORDER — ACETAMINOPHEN 325 MG/1
650 TABLET ORAL
Status: DISCONTINUED | OUTPATIENT
Start: 2022-06-04 | End: 2022-06-06 | Stop reason: HOSPADM

## 2022-06-04 RX ORDER — LIDOCAINE HYDROCHLORIDE AND EPINEPHRINE 15; 5 MG/ML; UG/ML
INJECTION, SOLUTION EPIDURAL
Status: COMPLETED | OUTPATIENT
Start: 2022-06-04 | End: 2022-06-04

## 2022-06-04 RX ORDER — OXYTOCIN/RINGER'S LACTATE 30/500 ML
87.3 PLASTIC BAG, INJECTION (ML) INTRAVENOUS AS NEEDED
Status: DISCONTINUED | OUTPATIENT
Start: 2022-06-04 | End: 2022-06-06 | Stop reason: HOSPADM

## 2022-06-04 RX ORDER — NALOXONE HYDROCHLORIDE 0.4 MG/ML
0.4 INJECTION, SOLUTION INTRAMUSCULAR; INTRAVENOUS; SUBCUTANEOUS AS NEEDED
Status: DISCONTINUED | OUTPATIENT
Start: 2022-06-04 | End: 2022-06-04 | Stop reason: HOSPADM

## 2022-06-04 RX ORDER — BUPIVACAINE HYDROCHLORIDE 2.5 MG/ML
INJECTION, SOLUTION EPIDURAL; INFILTRATION; INTRACAUDAL
Status: COMPLETED | OUTPATIENT
Start: 2022-06-04 | End: 2022-06-04

## 2022-06-04 RX ORDER — MISOPROSTOL 200 UG/1
TABLET ORAL
Status: DISCONTINUED
Start: 2022-06-04 | End: 2022-06-04 | Stop reason: WASHOUT

## 2022-06-04 RX ORDER — HYDROCODONE BITARTRATE AND ACETAMINOPHEN 5; 325 MG/1; MG/1
1 TABLET ORAL
Status: DISCONTINUED | OUTPATIENT
Start: 2022-06-04 | End: 2022-06-06 | Stop reason: HOSPADM

## 2022-06-04 RX ORDER — NALOXONE HYDROCHLORIDE 0.4 MG/ML
0.4 INJECTION, SOLUTION INTRAMUSCULAR; INTRAVENOUS; SUBCUTANEOUS AS NEEDED
Status: DISCONTINUED | OUTPATIENT
Start: 2022-06-04 | End: 2022-06-06 | Stop reason: HOSPADM

## 2022-06-04 RX ORDER — SIMETHICONE 80 MG
80 TABLET,CHEWABLE ORAL
Status: DISCONTINUED | OUTPATIENT
Start: 2022-06-04 | End: 2022-06-06 | Stop reason: HOSPADM

## 2022-06-04 RX ORDER — HYDROCORTISONE ACETATE PRAMOXINE HCL 2.5; 1 G/100G; G/100G
CREAM TOPICAL AS NEEDED
Status: DISCONTINUED | OUTPATIENT
Start: 2022-06-04 | End: 2022-06-06 | Stop reason: HOSPADM

## 2022-06-04 RX ORDER — SODIUM CHLORIDE, SODIUM LACTATE, POTASSIUM CHLORIDE, CALCIUM CHLORIDE 600; 310; 30; 20 MG/100ML; MG/100ML; MG/100ML; MG/100ML
125 INJECTION, SOLUTION INTRAVENOUS CONTINUOUS
Status: DISCONTINUED | OUTPATIENT
Start: 2022-06-04 | End: 2022-06-06

## 2022-06-04 RX ADMIN — OXYTOCIN 2 MILLI-UNITS/MIN: 10 INJECTION INTRAVENOUS at 07:46

## 2022-06-04 RX ADMIN — IBUPROFEN 800 MG: 400 TABLET, FILM COATED ORAL at 20:41

## 2022-06-04 RX ADMIN — SODIUM CHLORIDE, POTASSIUM CHLORIDE, SODIUM LACTATE AND CALCIUM CHLORIDE 1000 ML: 600; 310; 30; 20 INJECTION, SOLUTION INTRAVENOUS at 00:16

## 2022-06-04 RX ADMIN — Medication 5 ML: at 01:13

## 2022-06-04 RX ADMIN — Medication 10 ML/HR: at 09:55

## 2022-06-04 RX ADMIN — FENTANYL CITRATE 100 MCG: 50 INJECTION, SOLUTION INTRAMUSCULAR; INTRAVENOUS at 01:14

## 2022-06-04 RX ADMIN — SODIUM CHLORIDE, POTASSIUM CHLORIDE, SODIUM LACTATE AND CALCIUM CHLORIDE 125 ML/HR: 600; 310; 30; 20 INJECTION, SOLUTION INTRAVENOUS at 06:32

## 2022-06-04 RX ADMIN — Medication 10 ML/HR: at 01:22

## 2022-06-04 RX ADMIN — SODIUM CHLORIDE, POTASSIUM CHLORIDE, SODIUM LACTATE AND CALCIUM CHLORIDE 125 ML/HR: 600; 310; 30; 20 INJECTION, SOLUTION INTRAVENOUS at 01:22

## 2022-06-04 RX ADMIN — OXYTOCIN 10000 MILLI-UNITS: 10 INJECTION INTRAVENOUS at 11:42

## 2022-06-04 RX ADMIN — OXYTOCIN 87.3 MILLI-UNITS/MIN: 10 INJECTION INTRAVENOUS at 11:53

## 2022-06-04 RX ADMIN — BUPIVACAINE HYDROCHLORIDE 3 ML: 2.5 INJECTION, SOLUTION EPIDURAL; INFILTRATION; INTRACAUDAL; PERINEURAL at 01:13

## 2022-06-04 RX ADMIN — ONDANSETRON HYDROCHLORIDE 4 MG: 2 SOLUTION INTRAMUSCULAR; INTRAVENOUS at 12:17

## 2022-06-04 NOTE — PROGRESS NOTES
CNM Labor Progress Note     Patient: Sage Ingram MRN: [id number]  SSN: [id number]    YOB: 2000  Age: 23 y.o. Sex: female      Care assumed at 0800    Subjective:   Patient comfortable with epidural, not feeling any pain. Family remains at bedside and are providing excellent support. Objective:   Blood pressure (!) 95/53, pulse 62, temperature 98.2 °F (36.8 °C), resp. rate 16, height 5' 1\" (1.549 m), weight 83.9 kg (185 lb), last menstrual period 2021, SpO2 98 %, not currently breastfeeding. Patient Vitals for the past 4 hrs: Mode Fetal Heart Rate Fetal Activity Variability Decelerations Accelerations RN Reviewed Strip?   22 0730 External 135 Present 6-25 BPM None Yes Yes   22 0700 External 135 Present 6-25 BPM None Yes Yes   22 0630 External 135 Present 6-25 BPM None No Yes   22 0600 External 135 Present 6-25 BPM None No Yes   22 0530 External 135 Present 6-25 BPM None Yes Yes     Uterine contractions q 4 minutes, moderate to palpation, resting tone soft, Sterile Vaginal Exam: deferred, fetal presentation vertex, membranes ruptured for continued clear fluid    Pitocin at 4 mu/min    Assessment:     40w0d  Category 1 fetal heart rate tracing   ROM x 15 hours  Hx aortic aneurysm repair, cleared by cardiology  Hx asthma    Plan:   Introduced self to patient, reviewed POC. Cont to titrate pitocin to adequate ctx pattern as fetus tolerates. Recheck cervix 4 hours after adequate ctx pattern achieved, sooner with maternal or fetal indication. Limit SVEs to prevent infection.   Maternal and fetal monitoring per protocol  Anticipate     Ramirez Late, CNM

## 2022-06-04 NOTE — ANESTHESIA PREPROCEDURE EVALUATION
Relevant Problems   No relevant active problems       Anesthetic History   No history of anesthetic complications            Review of Systems / Medical History  Patient summary reviewed, nursing notes reviewed and pertinent labs reviewed    Pulmonary            Asthma        Neuro/Psych   Within defined limits           Cardiovascular  Within defined limits                  Comments: Hx congential heart disease, aortic dilation repair as a child.    GI/Hepatic/Renal  Within defined limits              Endo/Other  Within defined limits           Other Findings              Physical Exam    Airway  Mallampati: II  TM Distance: > 6 cm  Neck ROM: normal range of motion   Mouth opening: Normal     Cardiovascular  Regular rate and rhythm,  S1 and S2 normal,  no murmur, click, rub, or gallop             Dental  No notable dental hx       Pulmonary  Breath sounds clear to auscultation               Abdominal  GI exam deferred       Other Findings            Anesthetic Plan    ASA: 2  Anesthesia type: epidural            Anesthetic plan and risks discussed with: Patient

## 2022-06-04 NOTE — PROGRESS NOTES
0745 Report received from Malick Kaba, RN    1688 Pt turned to left side w/ peanut ball in place. Pitocin started at this time. 2194 RN to bedside to assess. Pt turned to L side. With peanut ball in place. 5535 DONYA Sarabia, RN to bedside to assess pt. Plan is to go up on the pitocin. 1000 Pt straight cathd at this time for 500ml clear urine. 1005 Pt turned on right side in exaggerated runners lunge with peanut ball in place. 1030 Pt starting to feel intermittent pressure. Pts epidural bolus button hit at this time. 1050 DONYA Tateole to bedside. 10/100/+2.    1053 Pt starting to push. RN at bedside, continuously monitoring FHR tracing    1115 RN at bedside, continuously monitoring FHR tracing    1130 RN at bedside, continuously monitoring FHR tracing    1137 Healthy baby girl delivered via  by DONYA Quinones Tate    1500 Pt OOB w/ this RN to wheelchair to transfer to MIU.

## 2022-06-04 NOTE — ROUTINE PROCESS
TRANSFER - IN REPORT:    Verbal report received from Main Alan RN(name) on [address]  being received from L&D(unit) for routine progression of care      Report consisted of patients Situation, Background, Assessment and   Recommendations(SBAR). Information from the following report(s) SBAR, Intake/Output, MAR and Recent Results was reviewed with the receiving nurse. Opportunity for questions and clarification was provided. Assessment completed upon patients arrival to unit and care assumed.

## 2022-06-04 NOTE — ANESTHESIA PROCEDURE NOTES
Epidural Block    Patient location during procedure: OB  Reason for block: labor epidural  Staffing  Performed: attending   Anesthesiologist: Gertrudis Henry,   Preanesthetic Checklist  Completed: patient identified, IV checked, site marked, risks and benefits discussed, surgical consent, monitors and equipment checked, pre-op evaluation and timeout performed  Block Placement  Patient position: sitting  Prep: ChloraPrep  Sterility prep: cap, drape, gloves and mask  Sedation level: no sedation  Patient monitoring: continuous pulse oximetry and heart rate  Approach: midline  Location: lumbar  Lumbar location: L3-L4  Epidural  Loss of resistance technique: air  Guidance: landmark technique  Needle  Needle type: Tuohy   Needle gauge: 17 G  Needle length: 9 cm  Needle insertion depth: 7 cm  Catheter type: end hole  Catheter size: 19 G  Catheter at skin depth: 11 cm  Catheter securement method: clear occlusive dressing and surgical tape  Test dose: negative  Medications Administered  Bupivacaine (PF) (MARCAINE) 0.25% Epidural, 3 mL  lidocaine-EPINEPHrine (XYLOCAINE) 1.5 %-1:200,000 Epidural, 5 mL  Assessment  Sensory level: T10  Block outcome: pain improved  Number of attempts: 1  Procedure assessment: patient tolerated procedure well with no immediate complications

## 2022-06-04 NOTE — ED TRIAGE NOTES
2339- Pt arrives reporting contractions that began at 6pm pt also has noticed watery discharge with contractions. [address] at bedside. Nitrazine positive. SVE 3-4/80/-2    0006- Pt taken to room 4 for admission. Bedside shift change report given to Ailin Nath RN (oncoming nurse) by Stephen Gurrola RN (offgoing nurse). Report included the following information SBAR, Intake/Output and MAR.

## 2022-06-04 NOTE — ROUTINE PROCESS
0006 Admitted to [facility] for labor  0015 Fluid Bolus started  0057 Dr Jorje Tobin at bedside for epidural placement, timeout performed   0225 Patient complaining of intermittent pressure, SVE 4/80/-1, turned to L side flying cowgirl  0325 R side flying cowgirl  0447 L side exaggerated runners  0547 R side exaggerated runners  0651 L side peanut ball between knees  0710 Patient called out stating she was feeling constant pressure, SVE unchanged, updated PADILLA Mcnair CNHINA orders to start pitocin  0730 OB SBAR Report given to CHUY Jhaveri RN

## 2022-06-04 NOTE — ED PROVIDER NOTES
G1 @ 39+6 arrives to [facility] with contractions and leaking clear fluid since 1800. They are increasing in intensity. She plans to have an epidural but wants to wait. Denies bleeding. Reports wnl FM. Pt of Dr Jostin Godinez  Hx open aortic aneurysm repair 2006 - cardiology consult, cleared for   Mild asthma           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    Heart surgery age 5       Past Surgical History:   Procedure Laterality Date    HX OTHER SURGICAL  2006    Aorta dilation repair         Family History:   Problem Relation Age of Onset    Congenital heart defect Brother         dilation of aorta, surgical repair       Social History     Socioeconomic History    Marital status: SINGLE     Spouse name: Not on file    Number of children: Not on file    Years of education: Not on file    Highest education level: Not on file   Occupational History    Not on file   Tobacco Use    Smoking status: Current Every Day Smoker     Packs/day: 0.25     Types: Cigarettes    Smokeless tobacco: Never Used   Substance and Sexual Activity    Alcohol use: Yes     Comment: occa    Drug use: Never    Sexual activity: Not on file   Other Topics Concern    Not on file   Social History Narrative    Not on file     Social Determinants of Health     Financial Resource Strain:     Difficulty of Paying Living Expenses: Not on file   Food Insecurity:     Worried About Running Out of Food in the Last Year: Not on file    Kim of Food in the Last Year: Not on file   Transportation Needs:     Lack of Transportation (Medical): Not on file    Lack of Transportation (Non-Medical):  Not on file   Physical Activity:     Days of Exercise per Week: Not on file    Minutes of Exercise per Session: Not on file   Stress:     Feeling of Stress : Not on file   Social Connections:     Frequency of Communication with Friends and Family: Not on file    Frequency of Social Gatherings with Friends and Family: Not on file    Attends Mormon Services: Not on file    Active Member of Clubs or Organizations: Not on file    Attends Club or Organization Meetings: Not on file    Marital Status: Not on file   Intimate Partner Violence:     Fear of Current or Ex-Partner: Not on file    Emotionally Abused: Not on file    Physically Abused: Not on file    Sexually Abused: Not on file   Housing Stability:     Unable to Pay for Housing in the Last Year: Not on file    Number of [place] in the Last Year: Not on file    Unstable Housing in the Last Year: Not on file         ALLERGIES: Clarithromycin    Review of Systems   Constitutional: Negative. Negative for chills and fever. HENT: Negative. Eyes: Negative. Respiratory: Negative. Negative for shortness of breath. Cardiovascular: Negative. Gastrointestinal: Negative. Negative for constipation, diarrhea, nausea and vomiting. Endocrine: Negative. Genitourinary: Negative. Negative for dysuria and hematuria. Musculoskeletal: Negative. Skin: Negative. Allergic/Immunologic: Negative. Neurological: Negative. Negative for light-headedness and headaches. Hematological: Negative. Psychiatric/Behavioral: Negative. Vitals:    06/03/22 2332   BP: (!) 148/90   Pulse: 67   Resp: 16   Temp: 98.3 °F (36.8 °C)   Weight: 83.9 kg (185 lb)   Height: 5' 1\" (1.549 m)            Physical Exam  Constitutional:       Appearance: Normal appearance. HENT:      Head: Normocephalic. Cardiovascular:      Rate and Rhythm: Normal rate. Pulmonary:      Effort: Pulmonary effort is normal.   Abdominal:      Palpations: Abdomen is soft. Tenderness: There is no abdominal tenderness. Genitourinary:     General: Normal vulva. Comments: 3-4/80/-2  Musculoskeletal:         General: Normal range of motion. Cervical back: Normal range of motion. Skin:     General: Skin is warm and dry.    Neurological:      General: No focal deficit present. Mental Status: She is alert and oriented to person, place, and time. Psychiatric:         Mood and Affect: Mood normal.         Behavior: Behavior normal.      EFM: 140, moderate variability, accels present, no decels    MDM  Number of Diagnoses or Management Options  Risk of Complications, Morbidity, and/or Mortality  Presenting problems: moderate  Diagnostic procedures: moderate  Management options: moderate    Critical Care  Total time providing critical care: 30-74 minutes    Patient Progress  Patient progress: stable    ED Course as of 06/03/22 2351   Fri Jun 03, 2022   2346 G1 @ 39+6 ruptured and in labor  1. Admit to L&D  2. GBS neg  3. Plan epidural, but not yet  4.  [facility] [address] [facility]    Family friend at the bedside translating [EF]      ED Course User Index  [EF] Maya Morrison CNM       Procedures

## 2022-06-04 NOTE — L&D DELIVERY NOTE
CNM Delivery Note       Patient: Za Crow MRN: [id number]  SSN: [id number]    YOB: 2000  Age: 23 y.o. Sex: female         of a live female infant at 56 with Apgars 8,9 in BRENNEN position under epidural anesthesia with mother in stirrups position. Turtle sign immedialty noted upon delivery of fetal to nose. Pt slowly moved head further and CNM was able to assist with delivery of chin with Arun manuver. Nuchal x 1 noted and reduced. Right shoulder dystocia noted and HOB reclined and Pt placed in trendelenburg. Pt unable to deliver. Pt instructed to stop pushing and reverse woods maneuver completed by CNM. Pt then instructed to push again and CNM able to delivery shoulders with gentle traction. Stunned infant placed on maternal abdomen immediately following delivery. Tactile stimulation and bulb suctioning performed at infant quickly became vigorous. Placenta and membranes spontaneous, intact, with 3 vessel cord via [place] mechanism at 1141. Fundus massaged to firm. Vagina and perineum inspected. Perineum intact. Very small bilateral vaginal lacerations noted, bleeding, each repaired with 3-0 vicryl to great hemostasis. Bladder emptied in sterile fashion with red rubber catheter. Mother and infant stable, bonding, establishing breastfeeding. Infant later examined in warmer, using both arms without difficulty. Delivery Summary    Patient: Za Crow MRN: [id number]  SSN: [id number]    YOB: 2000  Age: 23 y.o.   Sex: female       Information for the patient's :  Leonor Carroll [[id number]]       Labor Events:    Labor: No    Steroids: None   Cervical Ripening Date/Time:       Cervical Ripening Type: None   Antibiotics During Labor: No   Rupture Identifier:      Rupture Date/Time: 6/3/2022 6:00 PM   Rupture Type: SROM   Amniotic Fluid Volume:      Amniotic Fluid Description: Clear    Amniotic Fluid Odor: Induction:         Induction Date/Time:        Indications for Induction:      Augmentation:     Augmentation Date/Time:      Indications for Augmentation:     Labor complications: Additional complications:        Delivery Events:  Indications For Episiotomy:     Episiotomy: None   Perineal Laceration(s): None   Repaired:     Periurethral Laceration Location:      Repaired:     Labial Laceration Location:     Repaired:     Sulcal Laceration Location:     Repaired:     Vaginal Laceration Location: bilateral   Repaired: Yes   Cervical Laceration Location:     Repaired:     Repair Suture: Vicryl 3-0   Number of Repair Packets: 1   Estimated Blood Loss (ml):  ml   Quantitative Blood Loss (ml)                Delivery Date: 2022    Delivery Time: 11:37 AM  Delivery Type: Vaginal, Spontaneous  Sex:  Female    Gestational Age: 37w0d   Delivery Clinician:  Robert Duque  Living Status: Living   Delivery Location: L&D LDR 4          APGARS  One minute Five minutes Ten minutes   Skin color: 1   1        Heart rate: 2   2        Grimace: 2   2        Muscle tone: 2   2        Breathin   2        Totals: 8   9            Presentation: Vertex    Position: Left Occiput Anterior  Resuscitation Method:  Suctioning-bulb; Tactile Stimulation     Meconium Stained: Terminal      Cord Information: 3 Vessels  Complications: Nuchal Cord Without Compressions  Cord around: head  Delayed cord clamping? Yes  Cord clamped date/time:2022 11:39 AM  Disposition of Cord Blood: Lab    Blood Gases Sent?: No    Placenta:  Date/Time: 2022 11:41 AM  Removal: Spontaneous      Appearance: Normal     Steger Measurements:  Birth Weight:        Birth Length:        Head Circumference:        Chest Circumference:       Abdominal Girth:       Other Providers:   ;Amy Young EA;;;;;;Keren PHAN, Obstetrician;Primary Nurse;Primary Steger Nurse;Nicu Nurse;Neonatologist;Anesthesiologist;Crna;Nurse Practitioner;Midwife;Nursery Nurse           Group B Strep:   Lab Results   Component Value Date/Time    GrBStrep, External Negative 2022 12:00 AM     Information for the patient's :  Patnera Bass [[id number]]     Lab Results   Component Value Date/Time    ABO/Rh(D) O POSITIVE 2022 11:49 AM    RENAE IgG NEG 2022 11:49 AM    Bilirubin if RENAE pos: IF DIRECT DMITRY POSITIVE, BILIRUBIN TO FOLLOW 2022 11:49 AM      No results for input(s): PCO2CB, PO2CB, HCO3I, SO2I, IBD, PTEMPI, SPECTI, PHICB, ISITE, IDEV, IALLEN in the last 72 hours.    ,   Information for the patient's :  Pantera Bass [[id number]]   Shoulder Dystocia Details:       Affected Side: right      Date and Time Recognized:        Help Called By:   at        Physician/Provider:   arrived at        NICU Staff:   arrived at        Additional Staff Arrived:   at        Gentle Attempt at Traction:     If no, why:        First Maneuver: Imer maneuver  Woods screw maneuver at   by        [employer]:   at   by        [employer]:   at   by

## 2022-06-04 NOTE — H&P
G1 @ 39+6 admitted to L&D in labor, reports contractions and leaking clear fluid since 1800. They are increasing in intensity. She plans to have an epidural but wants to wait. Denies bleeding. Reports wnl FM.     Pt of Dr Paloma Ellsworth  Hx open aortic aneurysm repair 2006 - cardiology consult, cleared for   Mild asthma              Past Medical History        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     Heart surgery age 5            Past Surgical History         Past Surgical History:   Procedure Laterality Date    HX OTHER SURGICAL   2006     Aorta dilation repair                   Family History:   Problem Relation Age of Onset    Congenital heart defect Brother           dilation of aorta, surgical repair         Social History            Socioeconomic History    Marital status: SINGLE       Spouse name: Not on file    Number of children: Not on file    Years of education: Not on file    Highest education level: Not on file   Occupational History    Not on file   Tobacco Use    Smoking status: Current Every Day Smoker       Packs/day: 0.25       Types: Cigarettes    Smokeless tobacco: Never Used   Substance and Sexual Activity    Alcohol use: Yes       Comment: occa    Drug use: Never    Sexual activity: Not on file   Other Topics Concern    Not on file   Social History Narrative    Not on file      Social Determinants of Health      Financial Resource Strain:     Difficulty of Paying Living Expenses: Not on file   Food Insecurity:     Worried About Running Out of Food in the Last Year: Not on file    Kim of Food in the Last Year: Not on file   Transportation Needs:     Lack of Transportation (Medical): Not on file    Lack of Transportation (Non-Medical):  Not on file   Physical Activity:     Days of Exercise per Week: Not on file    Minutes of Exercise per Session: Not on file   Stress:     Feeling of Stress : Not on file   Social Connections:     Frequency of Communication with Friends and Family: Not on file    Frequency of Social Gatherings with Friends and Family: Not on file    Attends Orthodox Services: Not on file    Active Member of Clubs or Organizations: Not on file    Attends Club or Organization Meetings: Not on file    Marital Status: Not on file   Intimate Partner Violence:     Fear of Current or Ex-Partner: Not on file    Emotionally Abused: Not on file    Physically Abused: Not on file    Sexually Abused: Not on file   Housing Stability:     Unable to Pay for Housing in the Last Year: Not on file    Number of [place] in the Last Year: Not on file    Unstable Housing in the Last Year: Not on file            ALLERGIES: Clarithromycin     Review of Systems   Constitutional: Negative. Negative for chills and fever. HENT: Negative. Eyes: Negative. Respiratory: Negative. Negative for shortness of breath. Cardiovascular: Negative. Gastrointestinal: Negative. Negative for constipation, diarrhea, nausea and vomiting. Endocrine: Negative. Genitourinary: Negative. Negative for dysuria and hematuria. Musculoskeletal: Negative. Skin: Negative. Allergic/Immunologic: Negative. Neurological: Negative. Negative for light-headedness and headaches. Hematological: Negative. Psychiatric/Behavioral: Negative.              Vitals:     06/03/22 2332   BP: (!) 148/90   Pulse: 67   Resp: 16   Temp: 98.3 °F (36.8 °C)   Weight: 83.9 kg (185 lb)   Height: 5' 1\" (1.549 m)             Physical Exam  Constitutional:       Appearance: Normal appearance. HENT:      Head: Normocephalic. Cardiovascular:      Rate and Rhythm: Normal rate. Pulmonary:      Effort: Pulmonary effort is normal.   Abdominal:      Palpations: Abdomen is soft. Tenderness: There is no abdominal tenderness. Genitourinary:     General: Normal vulva. Comments: 3-4/80/-2  Musculoskeletal:         General: Normal range of motion. Cervical back: Normal range of motion. Skin:     General: Skin is warm and dry. Neurological:      General: No focal deficit present. Mental Status: She is alert and oriented to person, place, and time. Psychiatric:         Mood and Affect: Mood normal.         Behavior: Behavior normal.       EFM: 140, moderate variability, accels present, no decels                     G1 @ 39+6 ruptured and in labor  1. Admit to L&D  2. GBS neg  3. Plan epidural, but not yet  4.  Send [facility] labs     Family friend at the bedside translating

## 2022-06-04 NOTE — LACTATION NOTE
This note was copied from a baby's chart. Mother reports Baby nursing well after delivery, deep latch obtained, mother is comfortable, baby feeding vigorously with rhythmic suck, swallow, breathe pattern, both breasts offered, baby is skin to skin for feeding. [place]i handouts given. Mother understands some english. Family also translating. Mother declined offer of formal  at this time.

## 2022-06-04 NOTE — PROGRESS NOTES
[facility] Labor Progress Note     Patient: Jitendra Dixon MRN: [id number]  SSN: [id number]    YOB: 2000  Age: 23 y.o. Sex: female        Subjective:   Patient coping well with contractions, but feeling a lot of pressure and wants to push. Family is at bedside providing excellent support and encouragement. Objective:   Blood pressure (!) 95/53, pulse 62, temperature 98.2 °F (36.8 °C), resp. rate 16, height 5' 1\" (1.549 m), weight 83.9 kg (185 lb), last menstrual period 2021, SpO2 98 %, not currently breastfeeding. Patient Vitals for the past 4 hrs: Mode Fetal Heart Rate Fetal Activity Variability Decelerations Accelerations RN Reviewed Strip?   22 1137 External 115 -- 6-25 BPM -- -- Yes   22 1130 External 120 -- 6-25 BPM None Yes Yes   22 1115 External 120 -- 6-25 BPM -- -- Yes   22 1100 External 135 -- 6-25 BPM Variable Yes Yes   22 1045 External 130 -- 6-25 BPM -- -- Yes   22 1030 External 130 -- 6-25 BPM Variable Yes Yes   22 1015 External 130 -- 6-25 BPM -- -- Yes   22 1010 US Readjusted -- -- -- -- -- --   22 1003 US Readjusted -- -- -- -- -- --   22 1000 External 130 -- 6-25 BPM Early;Variable No Yes   22 0945 External 130 -- 6-25 BPM -- -- Yes   22 0930 External 135 Present 6-25 BPM None Yes Yes   22 0915 External 135 -- 6-25 BPM -- -- Yes   22 0900 External 135 Present 6-25 BPM None Yes Yes   22 0845 External 135 -- 6-25 BPM -- -- Yes   22 0830 External 130 Present 6-25 BPM None Yes Yes     Uterine contractions q 2-3 minutes, strong to palpation, resting tone soft, Sterile Vaginal Exam: 10 cm dilated/ 100 % effaced/ +2 station, fetal presentation vertex, membrane ruptured for continued clear fluid    Pitocin at 8 mu/min    Assessment:     40w0d  Category 1 fetal heart rate tracing   ROM x 17 hours    Plan:   Complete!!  Start pushing!   Anticipate     Lisa Moore, CNM

## 2022-06-05 PROCEDURE — 74011250637 HC RX REV CODE- 250/637: Performed by: ADVANCED PRACTICE MIDWIFE

## 2022-06-05 PROCEDURE — 65410000002 HC RM PRIVATE OB

## 2022-06-05 RX ADMIN — IBUPROFEN 800 MG: 400 TABLET, FILM COATED ORAL at 21:26

## 2022-06-05 RX ADMIN — IBUPROFEN 800 MG: 400 TABLET, FILM COATED ORAL at 09:45

## 2022-06-05 NOTE — PROGRESS NOTES
Post-Partum Day Number 1 Progress Note    Patient doing well post-partum without significant complaints. Voiding without difficulty, normal lochia. Denies ha/sob/cp/palpitations/ruqp/change in vision. Vitals:  Patient Vitals for the past 24 hrs:   BP Temp Pulse Resp SpO2   22 0132 123/70 97.8 °F (36.6 °C) 60 16 97 %   22 2030 130/73 99.6 °F (37.6 °C) 74 16 96 %   22 1530 133/77 98.8 °F (37.1 °C) 70 16 97 %   22 1406 120/61 -- 69 -- 98 %   22 1351 127/66 -- 71 -- 97 %   22 1329 127/66 -- 73 -- --   22 1306 (!) 145/77 -- 69 -- 97 %   22 1251 139/73 -- 78 -- 97 %   22 1236 135/63 -- 80 -- 97 %   22 1221 (!) 142/66 -- 81 -- 97 %   22 1206 (!) 147/75 -- 84 -- 98 %     Temp (24hrs), Av.7 °F (37.1 °C), Min:97.8 °F (36.6 °C), Max:99.6 °F (37.6 °C)      Vital signs stable, afebrile. Exam:  Patient without distress. Abdomen soft, fundus firm, nontender               Lower extremities- nontender without edema; no cords    Labs: No results found for this or any previous visit (from the past 24 hour(s)). Assessment and Plan: PPD 1 s/p delivery c/b shoulder dystocia. O+/RI/female infant. Patient appears to be having uncomplicated post-partum course. Asymptomatic for evolving preE. Infant is at bedside s/p normal initial physical exam by pediatrician. Continue routine perineal care and maternal education. Plan discharge tomorrow if no problems occur. Will need 1 week postpartum BP check.       Signed By: Seferino Solano DO     2022

## 2022-06-05 NOTE — PROGRESS NOTES
~8467: Bedside and Verbal shift change report given to FELICIA Klein by Jaki Goldstein. Carlos Mcmillan RN. Report included the following information JOSE and MAR.

## 2022-06-05 NOTE — ROUTINE PROCESS
0740- Bedside shift change report given to PADILLA Schulte (oncoming nurse) by Mary Bran RN (offgoing nurse). Report included the following information SBAR.

## 2022-06-05 NOTE — LACTATION NOTE
This note was copied from a baby's chart. Baby nursing well today,  deep latch obtained, mother is comfortable, baby feeding vigorously with rhythmic suck, swallow, breathe pattern, audible swallowing, and evident milk transfer, both breasts offered, baby is asleep following feeding. Mother taught how to obtain deep latch.

## 2022-06-06 VITALS
TEMPERATURE: 98.1 F | DIASTOLIC BLOOD PRESSURE: 69 MMHG | BODY MASS INDEX: 34.93 KG/M2 | SYSTOLIC BLOOD PRESSURE: 128 MMHG | OXYGEN SATURATION: 96 % | WEIGHT: 185 LBS | HEART RATE: 58 BPM | RESPIRATION RATE: 14 BRPM | HEIGHT: 61 IN

## 2022-06-06 PROCEDURE — 74011250637 HC RX REV CODE- 250/637: Performed by: ADVANCED PRACTICE MIDWIFE

## 2022-06-06 RX ORDER — IBUPROFEN 800 MG/1
800 TABLET ORAL
Qty: 30 TABLET | Refills: 0 | Status: SHIPPED | OUTPATIENT
Start: 2022-06-06

## 2022-06-06 RX ADMIN — IBUPROFEN 800 MG: 400 TABLET, FILM COATED ORAL at 06:06

## 2022-06-06 NOTE — ROUTINE PROCESS
Bedside shift change report given to Reubin Bernheim, RN (oncoming nurse) by Capri Hernandez RN (offgoing nurse). Report included the following information SBAR.       1106: I have reviewed discharge instructions with the patient. The patient verbalized understanding.

## 2022-06-06 NOTE — DISCHARGE SUMMARY
Obstetrical Discharge Summary     Name: Marisabel Wharton MRN: [id number]  SSN: [id number]    YOB: 2000  Age: 23 y.o. Sex: female      Allergies: Clarithromycin    Admit Date: 6/3/2022    Discharge Date: 2022     Admitting Physician: Josué Cobb MD     Attending Physician:  Doris Gutierrez., DO     * Admission Diagnoses: 39 weeks gestation of pregnancy [Z3A.39]    * Discharge Diagnoses:   Information for the patient's :  [facility] [[id number]]   Delivery of a 3.59 kg female infant via Vaginal, Spontaneous on 2022 at 11:37 AM  by Gaylan Prophet. Apgars were 8  and 9 . Additional Diagnoses:   Hospital Problems as of 2022 Date Reviewed: 2022    None         Lab Results   Component Value Date/Time    Rubella, External Immune 2022 12:00 AM    GrBStrep, External Negative 2022 12:00 AM    ABO,Rh O positive 2022 12:00 AM      Immunization History   Administered Date(s) Administered    COVID-19, Pfizer Purple top, DILUTE for use, 12+ yrs, 30mcg/0.3mL dose 2021, 06/10/2021       * Procedures:  on 22. Girl.  GHTN-no meds  * No surgery found *      Retired Use Flowsheet (6) [employer]  Depression Scale  I have been able to laugh and see the funny side of things: As much as I always could  I have looked forward with enjoyment to things: As much as I ever did  I have blamed myself unnecessarily when things went wrong: No, never  I have been anxious or worried for no good reason: No, not at all  I have felt scared or panicky for no very good reason: No, not at all  Things have been getting on top of me: No, I have been coping as well as ever  I have been so unhappy that I have had difficulty sleeping: No, not at all  I have felt sad or miserable: No, not at all  I have been so unhappy that I have been crying: No, never  The thought of harming myself has occurred to me: Never  Total Score: 0    * Discharge Condition: Physical Therapy Daily Progress Note    Patient Name: Pedrito Powell         :  1958  Referring Physician: Sherie Meehan APRN      Subjective   Pedrito Powell reports: overall feeling better since over-doing-it w/ wts - Less pain, but still more than prior - pain into biceps mm -     Objective   Less tender overall w/ fewer trigger points biceps, pec region -     See Exercise, Manual, and Modality Logs for complete treatment.     Functional / Therapeutic Activities:  25 min  · TAPING / BRACING: K-tape to unload Anterior arm/shoulder; Lateral arm/shoulder; Post arm/shoulder (L)  · SEE EXERCISE FLOW SHEET -   · Jt protection, ADL modification; Posture and       Assessment/Plan  63 yo female - S/P (L) RTSA - 2021  Doing well w/ P/AA/AROM into FE, but more limited and painful ER but improving  Increased pain after over-doing -it w/ wts at home, but improved overall -     Progress strengthening /stabilization /functional activity - resume full program as pain allows -      _________________________________________________    Manual Therapy:            15     mins  76244;  Therapeutic Exercise:    20    mins  31354;     Neuromuscular Dion:        mins  01789;    Therapeutic Activity:     20      mins  47028;     Ultrasound:                          mins  90066;  Iontophoresis:                     mins  82858;    Electrical Stimulation:         mins  20395 ( );  Mechanical Traction:          mins  17634  DIAN (LKristie Bauman             15     mins NC     Timed Treatment:   55    mins                  Total Treatment:     80    mins    Antonio Coley PT  Physical Therapist   [facility] Course: Normal hospital course following the delivery. * Disposition: Home    Discharge Medications:   Current Discharge Medication List      START taking these medications    Details   ibuprofen (MOTRIN) 800 mg tablet Take 1 Tablet by mouth every eight (8) hours as needed for Pain. Qty: 30 Tablet, Refills: 0  Start date: 6/6/2022         CONTINUE these medications which have NOT CHANGED    Details   PNV Comb #2-Iron-FA-Omega 3 29-1-400 mg cmpk Take  by mouth. * Follow-up Care/Patient Instructions:   Activity: no sex, douching, tampons, bath/pool x 6 weeks  Diet: Regular Diet  Wound Care: None needed    Follow-up Information     Follow up With Specialties Details Why Contact Info    Nathaly Bell., DO Obstetrics & Gynecology In 1 week  [address]  [address]  [facility]  [phone]

## 2022-06-06 NOTE — DISCHARGE INSTRUCTIONS
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 It is easy to get too tired and overwhelmed during the first weeks after your baby is born. Changes in your hormones can shift your mood without warning. You may find it hard to meet the extra demands on your energy and time. Take it easy on yourself. Follow-up care is a key part of your treatment and safety. Be sure to make and go to all appointments, and call your doctor if you are having problems. It's also a good idea to know your test results and keep a list of the medicines you take. How can you care for yourself at home? Vaginal Bleeding and Cramps  · After delivery, you will have a bloody discharge from the vagina. This will turn pink within a week and then white or yellow after about 10 days. It may last for 2 to 4 weeks or longer, until the uterus has healed. Use pads instead of tampons until you stop bleeding. · Do not worry if you pass some blood clots, as long as they are smaller than a golf ball. If you have a tear or stitches in your vaginal area, change the pad at least every 4 hours to prevent soreness and infection. · You may have cramps for the first few days after childbirth. These are normal and occur as the uterus shrinks to normal size.  Take an over-the-counter pain medicine, such as acetaminophen (Tylenol), ibuprofen (Advil, Motrin), or naproxen (Aleve), for cramps. Read and follow all instructions on the label. Do not take aspirin, because it can cause more bleeding. Do not take acetaminophen (Tylenol) and other acetaminophen containing medications (i.e. Percocet) at the same time. Episiotomy, Lacerations or Tears  · If you have stitches, they will dissolve on their own and do not need to be removed. · Put ice or a cold pack on your painful area for 10 to 20 minutes at a time, several times a day, for the first few days. Put a thin cloth between the ice and your skin. · Sit in a few inches of warm water (sitz bath) 3 times a day and after bowel movements. The warm water helps with pain and itching. If you do not have a tub, a warm shower might help. Breast fullness  · Your breasts may overfill (engorge) in the first few days after delivery. To help milk flow and to relieve pain, warm your breasts in the shower or by using warm, moist towels before nursing. · If you are not nursing, do not put warmth on your breasts or touch your breasts. Wear a tight bra or sports bra and use ice until the fullness goes away. This usually takes 2 to 3 days. · Put ice or a cold pack on your breast after nursing to reduce swelling and pain. Put a thin cloth between the ice and your skin. Activity  · Eat a balanced diet. Do not try to lose weight by cutting calories. Keep taking your prenatal vitamins, or take a multivitamin. · Get as much rest as you can. Try to take naps when your baby sleeps during the day. · Get some exercise every day. But do not do any heavy exercise until your doctor says it is okay. · Wait until you are healed (about 4 to 6 weeks) before you have sexual intercourse. Your doctor will tell you when it is okay to have sex. · Talk to your doctor about birth control. You can get pregnant even before your period returns. Also, you can get pregnant while you are breast-feeding.     Mental Health  · Many women get the \"baby blues\" during the first few days after childbirth. You may lose sleep, feel irritable, and cry easily. You may feel happy one minute and sad the next. Hormone changes are one cause of these emotional changes. Also, the demands of a new baby, along with visits from relatives or other family needs, add to a mother's stress. The \"baby blues\" often peak around the fourth day. Then they ease up in less than 2 weeks. · If your moodiness or anxiety lasts for more than 2 weeks, or if you feel like life is not worth living, you may have postpartum depression. This is different for each mother. Some mothers with serious depression may worry intensely about their infant's well-being. Others may feel distant from their child. Some mothers might even feel that they might harm their baby. A mother may have signs of paranoia, wondering if someone is watching her. · With all the changes in your life, you may not know if you are depressed. Pregnancy sometimes causes changes in how you feel that are similar to the symptoms of depression. · Symptoms of depression include:  · Feeling sad or hopeless and losing interest in daily activities. These are the most common symptoms of depression. · Sleeping too much or not enough. · Feeling tired. You may feel as if you have no energy. · Eating too much or too little. · [employer] ([employer]) offers a Warm line; Chat with the Expert phone sessions; Information and Articles about Pregnancy and Postpartum Mood Disorders; Comprehensive List of Free Support Groups; Knowledgeable local coordinators who will offer support, information, and resources; Guide to Resources on [employer]; Calendar of events in the  mood disorders community; Latest News and Research; and [facility] [address] for [employer]. Remember - You are not alone; You are not to blame; With help, you will be well. 7-577-783-PPD(4115). WWW. POSTPARTUM. NET    · Writing or talking about death, such as writing suicide notes or talking about guns, knives, or pills. Keep the numbers for these national suicide hotlines: 3-836-737-TALK (8-756.295.7854) and 1-492-NUEBXFY (1-437.806.1402). If you or someone you know talks about suicide or feeling hopeless, get help right away. Constipation and Hemorrhoids  Drink plenty of fluids, enough so that your urine is light yellow or clear like water. If you have kidney, heart, or liver disease and have to limit fluids, talk with your doctor before you increase the amount of fluids you drink. · Eat plenty of fiber each day. Have a bran muffin or bran cereal for breakfast, and try eating a piece of fruit for a mid-afternoon snack. · For painful, itchy hemorrhoids, put ice or a cold pack on the area several times a day for 10 minutes at a time. Follow this by putting a warm compress on the area for another 10 to 20 minutes or by sitting in a shallow, warm bath. When should you call for help? Call 911 anytime you think you may need emergency care. For example, call if:  · You are thinking of hurting yourself, your baby, or anyone else. · You passed out (lost consciousness). · You have symptoms of a blood clot in your lung (called a pulmonary embolism). These may include:  · Sudden chest pain. · Trouble breathing. · Coughing up blood. Call your doctor now or seek immediate medical care if:  · You have severe vaginal bleeding. · You are soaking through a pad each hour for 2 or more hours. · Your vaginal bleeding seems to be getting heavier or is still bright red 4 days after delivery. · You are dizzy or lightheaded, or you feel like you may faint. · You are vomiting or cannot keep fluids down. · You have a fever. · You have new or more belly pain. · You pass tissue (not just blood). · Your vaginal discharge smells bad. · Your belly feels tender or full and hard. · Your breasts are continuously painful or red.   · You feel sad, anxious, or hopeless for more than a few days. · You have sudden, severe pain in your belly. · You have symptoms of a blood clot in your leg (called a deep vein thrombosis), such as:  · Pain in your calf, back of the knee, thigh, or groin. · Redness and swelling in your leg or groin. · You have symptoms of preeclampsia, such as:  · Sudden swelling of your face, hands, or feet. · New vision problems (such as dimness or blurring). · A severe headache. · Your blood pressure is higher than it should be or rises suddenly. · You have new nausea or vomiting. Watch closely for changes in your health, and be sure to contact your doctor if you have any problems. Additional Information:  Learning About Hypertensive Disorders After Childbirth    What is preeclampsia? A woman with preeclampsia has blood pressure that is higher than usual. She may also have other serious symptoms. Preeclampsia can be dangerous. When it is severe, it can cause seizures (eclampsia) or liver or kidney damage. When the liver is affected, some women get HELLP syndrome, a blood-clotting and bleeding problem. HELLP can come on quickly and can be deadly. This is why your doctor checks you and your baby often. Preeclampsia usually occurs after 20 weeks of pregnancy. In rare cases, it is first noted right after childbirth. Most often, it starts near the end of pregnancy and goes away after childbirth. What are the symptoms? Mild preeclampsia usually doesn't cause symptoms. But preeclampsia can cause rapid weight gain and sudden swelling of the hands and face. Severe preeclampsia does cause symptoms. It can cause a very bad headache and trouble seeing and breathing. It also can cause belly pain. You may also urinate less than usual.    If you have new preeclampsia symptoms after you go home from the hospital, call your doctor right away. What can you expect after you have had preeclampsia?     In the hospital  After the baby and the placenta are delivered, preeclampsia usually starts to improve. Most women get better in the first few days after childbirth. After having preeclampsia, you still have a risk of seizures for a day or more after childbirth. (Very rarely, seizures happen later on.) So your doctor may have you take magnesium sulfate for a day or more to prevent seizures. You may also take medicine to lower your blood pressure. When you go home  Your blood pressure will most likely return to normal a few days after delivery. Your doctor will want to check your blood pressure sometime in the first week after you leave the hospital.    Some women still have high blood pressure 6 weeks after childbirth. But most return to normal levels over the long term. · Take and record your blood pressure at home if your doctor tells you to. · Learn the importance of the two measures of blood pressure (such as 120 over 80, or 120/80). The first number is the systolic pressure. This is the force of blood on the artery walls as the heart pumps. The second number is the diastolic pressure. This is the force of blood on the artery walls between heartbeats, when the heart is at rest. You have a choice of monitors to use. Manual monitor: You pump up the cuff and use a stethoscope to listen for your  Pulse. · Electronic monitor: The cuff inflates, and a gauge shows your pulse rate. · To take your blood pressure:  · Ask your doctor to check your blood pressure monitor to be sure that it is accurate and that the cuff fits you. Also ask your doctor to watch you use it, to make sure that you are using it right. · You should not eat, use tobacco products, or use medicine known to raise blood pressure (such as some nasal decongestant sprays) before you take your blood pressure. · Avoid taking your blood pressure if you have just exercised or are nervous or upset. Rest at least 15 minutes before you take your blood pressure. · Be safe with medicines. If you take medicine, take it exactly as prescribed. Call your doctor if you think you are having a problem with your medicine. · Do not smoke. Quitting smoking will help lower your blood pressure and improve your baby's growth and health. If you need help quitting, talk to your doctor about stop-smoking programs and medicines. These can increase your chances of quitting for good. · Eat a balanced and healthy diet that has lots of fruits and vegetables. Long-term health   After you have had preeclampsia, you have a higher-than-average risk of heart disease, stroke, and kidney disease. This may be because the same things that cause preeclampsia also cause heart and kidney disease. To protect your health, work with your doctor on living a heart-healthy lifestyle and getting the checkups you need. Your doctor may also want you to check your blood pressure at home. Follow-up care is a key part of your treatment and safety. Be sure to make and go to all appointments, and call your doctor if you are having problems. It's also a good idea to know your test results and keep a list of the medicines you take. Preventing Infection at Home    We care about preventing infection and avoiding the spread of germs - not only when you are in the hospital but also when you return home. When you return home from the hospital, it's important to take the following steps to help prevent infection and avoid spreading germs that could infect you and others. Ask everyone in your home to follow these guidelines, too. Clean Your Hands  · Clean your hands whenever your hands are visibly dirty, before you eat, before or after touching your mouth, nose or eyes, and before preparing food. Clean them after contact with body fluids, using the restroom, touching animals or changing diapers. · When washing hands, wet them with warm water and work up a lather.  Rub hands for at least 15 seconds, then rinse them and pat them dry with a clean towel or paper towel. · When using hand sanitizers, it should take about 15 seconds to rub your hands dry. If not, you probably didn't apply enough . Cover Your Sneeze or Cough  Germs are released into the air whenever you sneeze or cough. To prevent the spread of infection:  · Turn away from other people before coughing or sneezing. · Cover your mouth or nose with a tissue when you cough or sneeze. Put the tissue in the trash. · If you don't have a tissue, cough or sneeze into your upper sleeve, not your hands. · Always clean your hands after coughing or sneezing. Care for Wounds  Your skin is your body's first line of defense against germs, but an open wound leaves an easy way for germs to enter your body. To prevent infection:  · Clean your hands before and after changing wound dressings, and wear gloves to change dressings if recommended by your doctor. · Take special care with IV lines or other devices inserted into the body. If you must touch them, clean your hands first.  · Follow any specific instructions from your doctor to care for your wounds. Contact your doctor if you experience any signs of infection, such as fever or increased redness at the surgical or wound site. Keep a Clean Home  · Clean or wipe commonly touched hard surfaces like door handles, sinks, tabletops, phones and TV remotes. · Use products labeled \"disinfectant\" to kill harmful bacteria and viruses. · Use a clean cloth or paper towel to clean and dry surfaces. Wiping surfaces with a dirty dishcloth, sponge or towel will only spread germs. · Never share toothbrushes, brown, drinking glasses, utensils, razor blades, face cloths or bath towels to avoid spreading germs. · Be sure that the linens that you sleep on are clean. · Keep pets away from wounds and wash your hands after touching pets, their toys or bedding. We care about you and your health.  Remember, preventing infections is a   team effort between you, your family, friends and health care providers. Postpartum Support    PARENTS:  Are you feeling sad or depressed? Is it difficult for you to enjoy yourself? Do you feel more irritable or tense? Do you feel anxious or panicky? Are you having difficulty bonding with your baby? Do you feel as if you are \"out of control\" or \"going crazy\"? Are you worried that you might hurt your baby or yourself? FAMILIES: Do you worry that something is wrong but don't know how to help? Do you think that your partner or spouse is having problems coping? Are you worried that it may never get better? While many women experience some mild mood change or \"the blues\" during or after the birth of a child, 1 in 9 women experience more significant symptoms of depression or anxiety. 1 in 10 Dads become depressed during the first year. Things you can do  Being a good parent includes taking care of yourself. If you take care of yourself, you will be able to take better care of your baby and your family. · Talk to a counselor or healthcare provider who has training in  mood and anxiety problems. · Learn as much as you can about pregnancy and postpartum depression and anxiety. · Get support from family and friends. Ask for help when you need it. · Join a support group in your area or online. · Keep active by walking, stretching or whatever form of exercise helps you to feel better. · Get enough rest and time for yourself. · Eat a healthy diet. · Don't give up! It may take more than one try to get the right help you need. These are general instructions for a healthy lifestyle:    No smoking/ No tobacco products/ Avoid exposure to second hand smoke    Surgeon General's Warning:  Quitting smoking now greatly reduces serious risk to your health.     Obesity, smoking, and sedentary lifestyle greatly increases your risk for illness    A healthy diet, regular physical exercise & weight monitoring are important for maintaining a healthy lifestyle    Recognize signs and symptoms of STROKE:    F-face looks uneven    A-arms unable to move or move unevenly    S-speech slurred or non-existent    T-time-call 911 as soon as signs and symptoms begin - DO NOT go       back to bed or wait to see if you get better - TIME IS BRAIN. I have had the opportunity to make my options or choices for discharge. I have received and understand these instructions.

## 2022-06-06 NOTE — PROGRESS NOTES
Post-Partum Day Number 2 Progress/Discharge Note    Patient doing well post-partum without significant complaint. Voiding without difficulty, normal lochia. Denies cp/sob/n/v. Ambulating without problem.     /Blood pressure (!) 145/82, pulse (!) 58, temperature 97.5 °F (36.4 °C), resp. rate 14, height 5' 1\" (1.549 m), weight 83.9 kg (185 lb), last menstrual period 2021, SpO2 98 %, unknown if currently breastfeeding. Vitals:  Patient Vitals for the past 8 hrs:   BP Temp Pulse Resp SpO2   22 0050 (!) 145/82 97.5 °F (36.4 °C) (!) 58 14 98 %     Temp (24hrs), Av.8 °F (36.6 °C), Min:97.5 °F (36.4 °C), Max:97.9 °F (36.6 °C)      Vital signs stable, afebrile. Exam:  Patient without distress. Heart regular rate and rhythm   Lungs CTA b/l               Abdomen soft, fundus firm at level below umbilicus, non tender               Lower extremities are negative for swelling, cords or tenderness. Lab/Data Review:  no new labs    Assessment and Plan:  Patient appears to be having uncomplicated post-partum course. GHTN-normal to mild range bp on no meds. Continue routine perineal care and maternal education. Plan discharge for today with follow up in our office in 1 week. Benign labs. Girl.

## 2022-06-06 NOTE — ROUTINE PROCESS
Bedside shift change report given to Brenda Griffin RN (oncoming nurse) by Deepthi Salter RN (offgoing nurse). Report included the following information SBAR, Procedure Summary, Intake/Output, Recent Results and Med Rec Status.